# Patient Record
Sex: MALE | Race: WHITE | NOT HISPANIC OR LATINO | URBAN - METROPOLITAN AREA
[De-identification: names, ages, dates, MRNs, and addresses within clinical notes are randomized per-mention and may not be internally consistent; named-entity substitution may affect disease eponyms.]

---

## 2020-07-24 ENCOUNTER — EMERGENCY (EMERGENCY)
Facility: HOSPITAL | Age: 40
LOS: 1 days | Discharge: HOME | End: 2020-07-24
Attending: EMERGENCY MEDICINE
Payer: MEDICAID

## 2020-07-24 VITALS
HEART RATE: 89 BPM | OXYGEN SATURATION: 98 % | TEMPERATURE: 98 F | SYSTOLIC BLOOD PRESSURE: 121 MMHG | RESPIRATION RATE: 16 BRPM | DIASTOLIC BLOOD PRESSURE: 84 MMHG

## 2020-07-24 VITALS
HEART RATE: 98 BPM | WEIGHT: 257.94 LBS | SYSTOLIC BLOOD PRESSURE: 141 MMHG | TEMPERATURE: 98 F | HEIGHT: 72 IN | DIASTOLIC BLOOD PRESSURE: 90 MMHG | RESPIRATION RATE: 16 BRPM | OXYGEN SATURATION: 97 %

## 2020-07-24 PROCEDURE — 99283 EMERGENCY DEPT VISIT LOW MDM: CPT

## 2020-07-24 PROCEDURE — 99284 EMERGENCY DEPT VISIT MOD MDM: CPT

## 2020-07-24 RX ORDER — OXYCODONE HYDROCHLORIDE 5 MG/1
60 TABLET ORAL ONCE
Refills: 0 | Status: DISCONTINUED | OUTPATIENT
Start: 2020-07-24 | End: 2020-07-24

## 2020-07-24 RX ORDER — OXYCODONE HYDROCHLORIDE 5 MG/1
2 TABLET ORAL
Qty: 6 | Refills: 0
Start: 2020-07-24

## 2020-07-24 RX ADMIN — OXYCODONE HYDROCHLORIDE 60 MILLIGRAM(S): 5 TABLET ORAL at 02:58

## 2020-07-24 NOTE — ED PROVIDER NOTE - NSFOLLOWUPINSTRUCTIONS_ED_ALL_ED_FT
1. You were seen in the Emergency Room for back pain and medication refill   2. Please take all medications as prescribed. You may take ACETAMINOPHEN and IBUPROFEN as directed on packaging. Always follow medication instructions and read medication side effects. Stop using these medications if you have any concerns. You were prescribed Oxycodone for pain. You should take as prescribed. Do not drive or operate machinery while on this medication as it may make you sleepy  3. Please follow up with your doctor in 24-48 hours for medication refill  4. Return to the emergency room or seek immediate assistance for any new or concerning symptoms (such as fevers, chills, worsening back pain, difficulty walking, changes in strength or sensation ), or if you get worse.   5. Copies of your tests were provided to you for follow-up.  You must address all your findings with your doctor.

## 2020-07-24 NOTE — ED PROVIDER NOTE - PHYSICAL EXAMINATION
PHYSICAL EXAM:   General: well-appearing, appears stated age, in no acute distress  HEENT: NC/AT, L eye erythema (recently diagnosed with glaucoma, sees ophtho), airway patent  Cardiovascular: regular rate and rhythm, + S1/S2, no murmurs, rubs, gallops appreciated  Respiratory: clear to auscultation bilaterally, good aeration bilaterally, nonlabored respirations  Back: no costovertebral tenderness, no rashes noted, midline lower thoracic and lumbosacral spinal tenderness  Neuro: awake, alert, interactive. 5/5 strength of b/l LE, 2+ reflexes b/l LE. Sensation grossly intact to light touch of lower extremities. Ambulating independently. L sided straight leg test positive approx 45 degrees.   -Kate Ac PGY-2

## 2020-07-24 NOTE — ED PROVIDER NOTE - PROGRESS NOTE DETAILS
Kate Ac MD PGY-2 spoke with Dr. Montanez/Dr Gastelum office [phone] .  said that Dr. Moya said that this patient is not a patient of his anymore and this office did NOT send the patient to the ER for a refill. Paged placed to Dr. Moya requesting callback. Kate Ac MD PGY-2  pt requesting 9 days of oxycodone, tramadol, his sleeping pill to be refilled and his anxiety medication refilled. Told patient we can prescribed a few tablets of the oxycodone but will have to follow up with his pcp for the other medication refills. Patient eloped after he received the 60 mg oxycodone but prior to receiving his discharge instructions. Prior to receiving the oxy in the ER, pt stated that his girlfriend/wife could pick him up. If patient eloped, will not send oxycodone to pharmacy. Kate Ac MD PGY-2  pt requesting 9 days of oxycodone, tramadol, his sleeping pill to be refilled and his anxiety medication refilled. Told patient we can prescribed a few tablets of the oxycodone but will have to follow up with his pcp for the other medication refills. Kate Ac MD PGY-2  pt requesting 9 days of oxycodone, tramadol, his sleeping pill to be refilled and his anxiety medication refilled. Told patient we can prescribed a few tablets of the oxycodone but will have to follow up with his pcp for the other medication refills. Pt states wife can pick him up from ER

## 2020-07-24 NOTE — ED PROVIDER NOTE - ATTENDING CONTRIBUTION TO CARE
MD Olson:  patient seen and evaluated personally.   I agree with the History & Physical,  Impression & Plan other than what was detailed in my note.  MD Olson    39 y/o m w/ chronic pain presents to ed w/ cc of "I need a medication refill), takes 30 mg of oxy x2 qid, states he was told to come in and get two week prescription. no new traumas no change in back pain, no new numbness/tingling, no saddle anesthesia, afebrile vitals stable, non toxic, power 5/5  in b/l lower extrem, reflexes intact. sensation intact. no rf for emergent cause of back pain, noadditional images. attempted to call pmd who did not call back. did check istop, pt did not run out of prescription before time. plan to give 1-2 days of meds, first dose here. plan to dc.

## 2020-07-24 NOTE — ED PROVIDER NOTE - CLINICAL SUMMARY MEDICAL DECISION MAKING FREE TEXT BOX
41 yo M with PMH multiple herniated discs since working in [employer] years ago, GERD, glaucoma p/w worsening of his typical midline lower thoracic/lumbosacral back pain with radiation down posterior aspect of left thigh. No red flags for back pain. Will give oxy in ER (ensuring patient has ride home), and give a few pills to go home on to hold patient over until he can get refill from pcp

## 2020-07-24 NOTE — ED PROVIDER NOTE - OBJECTIVE STATEMENT
41 yo M with PMH multiple herniated discs since working in [employer] years ago p/w worsening of his typical midline lower thoracic/lumbosacral back pain with radiation down posterior aspect of left thigh. Typically takes 60 mg Oxycodone QID PRN. Took last oxycodone this AM (30 mg), gets chills and diarrhea when he runs out. Scheduled to see pain management July 29th. Called the office of Dr. graf and Dr. Moya reportedly who told him to come to the ER for a 2 week refill. No fevers, no urinary incontinence, no reported saddle anesthesia. Able to ambulate. 41 yo M with PMH multiple herniated discs since working in [employer] years ago, GERD, glaucoma p/w worsening of his typical midline lower thoracic/lumbosacral back pain with radiation down posterior aspect of left thigh. Typically takes 60 mg Oxycodone QID PRN. Took last oxycodone this AM (30 mg), gets chills and diarrhea when he runs out. Scheduled to see pain management July 29th. Called the office of Dr. graf and Dr. Moya reportedly who told him to come to the ER for a 2 week refill. No fevers, no urinary incontinence, no reported saddle anesthesia. Able to ambulate.

## 2020-07-24 NOTE — ED PROVIDER NOTE - NS ED ROS FT
REVIEW OF SYSTEMS:  General:  no fever, no chills  HEENT: no headache, no vision changes  Cardiac: no chest pain, no palpitations  Respiratory: no cough, no shortness of breath  Gastrointestinal: no abdominal pain, no nausea, no vomiting, no diarrhea, no melena, no hematochezia   Genitourinary: no hematuria, no dysuria, no urinary frequency, no urinary hesitancy, no urinary incontinence  Back: lower back pain midline radiating down left leg  Neuro: no reported saddle anesthesia  -Kate Ac PGY-2

## 2020-07-24 NOTE — ED PROVIDER NOTE - PATIENT PORTAL LINK FT
You can access the FollowMyHealth Patient Portal offered by [facility] by registering at the following website: [email or web address]. By joining [employer]’s FollowMyHealth portal, you will also be able to view your health information using other applications (apps) compatible with our system.

## 2020-12-24 PROBLEM — Z00.00 ENCOUNTER FOR PREVENTIVE HEALTH EXAMINATION: Status: ACTIVE | Noted: 2020-12-24

## 2021-06-17 PROBLEM — M54.9 DORSALGIA, UNSPECIFIED: Chronic | Status: ACTIVE | Noted: 2020-07-24

## 2021-06-17 PROBLEM — K21.9 GASTRO-ESOPHAGEAL REFLUX DISEASE WITHOUT ESOPHAGITIS: Chronic | Status: ACTIVE | Noted: 2020-07-24

## 2021-06-17 PROBLEM — H40.9 UNSPECIFIED GLAUCOMA: Chronic | Status: ACTIVE | Noted: 2020-07-24

## 2021-07-21 ENCOUNTER — APPOINTMENT (OUTPATIENT)
Dept: INTERNAL MEDICINE | Facility: CLINIC | Age: 41
End: 2021-07-21

## 2021-07-27 ENCOUNTER — APPOINTMENT (OUTPATIENT)
Dept: INTERNAL MEDICINE | Facility: CLINIC | Age: 41
End: 2021-07-27

## 2022-01-08 ENCOUNTER — EMERGENCY (EMERGENCY)
Facility: HOSPITAL | Age: 42
LOS: 1 days | Discharge: HOME | End: 2022-01-08
Attending: EMERGENCY MEDICINE
Payer: COMMERCIAL

## 2022-01-08 VITALS
RESPIRATION RATE: 18 BRPM | OXYGEN SATURATION: 100 % | SYSTOLIC BLOOD PRESSURE: 133 MMHG | TEMPERATURE: 98 F | HEIGHT: 72 IN | HEART RATE: 92 BPM | DIASTOLIC BLOOD PRESSURE: 89 MMHG | WEIGHT: 279.99 LBS

## 2022-01-08 VITALS
RESPIRATION RATE: 18 BRPM | OXYGEN SATURATION: 99 % | SYSTOLIC BLOOD PRESSURE: 132 MMHG | HEART RATE: 89 BPM | TEMPERATURE: 98 F | DIASTOLIC BLOOD PRESSURE: 90 MMHG

## 2022-01-08 PROCEDURE — 99284 EMERGENCY DEPT VISIT MOD MDM: CPT

## 2022-01-08 RX ORDER — TRAMADOL HYDROCHLORIDE 50 MG/1
1 TABLET ORAL
Qty: 12 | Refills: 0
Start: 2022-01-08 | End: 2022-01-10

## 2022-01-08 RX ORDER — TRAMADOL HYDROCHLORIDE 50 MG/1
50 TABLET ORAL ONCE
Refills: 0 | Status: DISCONTINUED | OUTPATIENT
Start: 2022-01-08 | End: 2022-01-08

## 2022-01-08 RX ADMIN — TRAMADOL HYDROCHLORIDE 50 MILLIGRAM(S): 50 TABLET ORAL at 21:44
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
## 2022-01-08 NOTE — ED PROVIDER NOTE - NSICDXPASTMEDICALHX_GEN_ALL_CORE_FT
PAST MEDICAL HISTORY:  Back pain hx of herniated discs    GERD (gastroesophageal reflux disease)     Glaucoma
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
## 2022-01-08 NOTE — ED PROVIDER NOTE - NSFOLLOWUPINSTRUCTIONS_ED_ALL_ED_FT
- stay hydrated.   - take tylenol 975mg and ibuprofen 600mg every 6 hours as needed for pain-take with meals.  -take tramadol as prescribed  -follow up with pain management, call number attached to make an appointment  - return if symptoms worsen, fever, weakness, numbness/tingling, blurred vision, difficulty ambulating and all other concerns.
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
## 2022-01-08 NOTE — ED PROVIDER NOTE - PHYSICAL EXAMINATION
A&Ox3, NAD, well appearing  NCAT. PERRL, EOMI.  Neck supple, no LAD.   Lungs CTAB. No w/r/r  Cardiac +S1S2, RRR, No m/r/g.   Abd soft, NT/ND, +BS, no rebound or guarding.   Extremities: cap refill <2, pulses in distal extremities 4+, no edema.   Skin without rash.   No focal Defecits, Strength 5/5 UE/LE. Gait steady. A&Ox3, NAD, well appearing  NCAT. PERRL, EOMI.  Neck supple, no LAD.   Lungs CTAB. No w/r/r  Cardiac +S1S2, RRR, No m/r/g.   Abd soft, NT/ND, +BS, no rebound or guarding.   Extremities: cap refill <2, pulses in distal extremities 4+, no edema. + left shoulder mild ttp. FROM of b/l ue, le.   Skin without rash.   No focal Defecits, Strength 5/5 UE/LE. Gait steady.

## 2022-01-08 NOTE — ED ADULT NURSE NOTE - DISCHARGE DATE/TIME
normal appearance , without tenderness upon palpation , no deformities , trachea midline , Thyroid normal size , no thyroid nodules , no masses , no JVD , thyroid nontender 08-Jan-2022 22:23
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
## 2022-01-08 NOTE — ED PROVIDER NOTE - PATIENT PORTAL LINK FT
You can access the FollowMyHealth Patient Portal offered by [facility] by registering at the following website: [email or web address]. By joining [employer]’s FollowMyHealth portal, you will also be able to view your health information using other applications (apps) compatible with our system.

## 2022-01-08 NOTE — ED PROVIDER NOTE - PROGRESS NOTE DETAILS
pt insisting we speak with Dr. Moya, spoke with Dr. moya who state she has not taken care of the pt in many years and is not a patient of his. -Anahi Calloway PA-C ISTOP: [phone]  shows last 1/3/2022 disepend 100  oxycodone 20 mg x 25 days. Hellen: spoke with patient and states has not been able to sleep. took 20 mg oxycodone 7 pm prior to arrival with 600 mg ibuprofen. states patches don't work for him. states doesn't want to increase his oxycodone at home.  requesting fentanyl in injection.  advised cannot give fentanyl in injection. patient ambulating with no difficulty, went to bathroom.  has taken tramadol in the past. will give a dose and d/c home. has f/u with pain management. Hellen: Patient asleep in bed. will d/c home ISTOP: [phone]  shows last 1/3/2022 dispensed 100  oxycodone 20 mg x 25 days.

## 2022-06-08 ENCOUNTER — APPOINTMENT (OUTPATIENT)
Dept: INTERNAL MEDICINE | Facility: CLINIC | Age: 42
End: 2022-06-08

## 2022-07-07 ENCOUNTER — EMERGENCY (EMERGENCY)
Facility: HOSPITAL | Age: 42
LOS: 1 days | Discharge: HOME | End: 2022-07-07
Attending: EMERGENCY MEDICINE
Payer: MEDICAID

## 2022-07-07 VITALS
DIASTOLIC BLOOD PRESSURE: 84 MMHG | SYSTOLIC BLOOD PRESSURE: 141 MMHG | OXYGEN SATURATION: 95 % | HEART RATE: 114 BPM | RESPIRATION RATE: 19 BRPM

## 2022-07-07 LAB
ALBUMIN SERPL ELPH-MCNC: 4.4 G/DL — SIGNIFICANT CHANGE UP (ref 3.3–5)
ALP SERPL-CCNC: 65 U/L — SIGNIFICANT CHANGE UP (ref 40–120)
ALT FLD-CCNC: 46 U/L — HIGH (ref 10–45)
ANION GAP SERPL CALC-SCNC: 16 MMOL/L — SIGNIFICANT CHANGE UP (ref 5–17)
APAP SERPL-MCNC: <15 UG/ML — SIGNIFICANT CHANGE UP (ref 10–30)
AST SERPL-CCNC: 25 U/L — SIGNIFICANT CHANGE UP (ref 10–40)
BASOPHILS # BLD AUTO: 0.05 K/UL — SIGNIFICANT CHANGE UP (ref 0–0.2)
BASOPHILS NFR BLD AUTO: 0.4 % — SIGNIFICANT CHANGE UP (ref 0–2)
BILIRUB SERPL-MCNC: 0.8 MG/DL — SIGNIFICANT CHANGE UP (ref 0.2–1.2)
BUN SERPL-MCNC: 14 MG/DL — SIGNIFICANT CHANGE UP (ref 7–23)
CALCIUM SERPL-MCNC: 9.2 MG/DL — SIGNIFICANT CHANGE UP (ref 8.4–10.5)
CHLORIDE SERPL-SCNC: 103 MMOL/L — SIGNIFICANT CHANGE UP (ref 96–108)
CO2 SERPL-SCNC: 22 MMOL/L — SIGNIFICANT CHANGE UP (ref 22–31)
CREAT SERPL-MCNC: 0.78 MG/DL — SIGNIFICANT CHANGE UP (ref 0.5–1.3)
EGFR: 114 ML/MIN/1.73M2 — SIGNIFICANT CHANGE UP
EOSINOPHIL # BLD AUTO: 0.29 K/UL — SIGNIFICANT CHANGE UP (ref 0–0.5)
EOSINOPHIL NFR BLD AUTO: 2.3 % — SIGNIFICANT CHANGE UP (ref 0–6)
ETHANOL SERPL-MCNC: <10 MG/DL — SIGNIFICANT CHANGE UP (ref 0–10)
GLUCOSE SERPL-MCNC: 136 MG/DL — HIGH (ref 70–99)
HCT VFR BLD CALC: 45.8 % — SIGNIFICANT CHANGE UP (ref 39–50)
HGB BLD-MCNC: 15.8 G/DL — SIGNIFICANT CHANGE UP (ref 13–17)
IMM GRANULOCYTES NFR BLD AUTO: 0.8 % — SIGNIFICANT CHANGE UP (ref 0–1.5)
LYMPHOCYTES # BLD AUTO: 27 % — SIGNIFICANT CHANGE UP (ref 13–44)
LYMPHOCYTES # BLD AUTO: 3.37 K/UL — HIGH (ref 1–3.3)
MCHC RBC-ENTMCNC: 29.1 PG — SIGNIFICANT CHANGE UP (ref 27–34)
MCHC RBC-ENTMCNC: 34.5 GM/DL — SIGNIFICANT CHANGE UP (ref 32–36)
MCV RBC AUTO: 84.3 FL — SIGNIFICANT CHANGE UP (ref 80–100)
MONOCYTES # BLD AUTO: 1.35 K/UL — HIGH (ref 0–0.9)
MONOCYTES NFR BLD AUTO: 10.8 % — SIGNIFICANT CHANGE UP (ref 2–14)
NEUTROPHILS # BLD AUTO: 7.31 K/UL — SIGNIFICANT CHANGE UP (ref 1.8–7.4)
NEUTROPHILS NFR BLD AUTO: 58.7 % — SIGNIFICANT CHANGE UP (ref 43–77)
NRBC # BLD: 0 /100 WBCS — SIGNIFICANT CHANGE UP (ref 0–0)
PLATELET # BLD AUTO: 371 K/UL — SIGNIFICANT CHANGE UP (ref 150–400)
POTASSIUM SERPL-MCNC: 3.5 MMOL/L — SIGNIFICANT CHANGE UP (ref 3.5–5.3)
POTASSIUM SERPL-SCNC: 3.5 MMOL/L — SIGNIFICANT CHANGE UP (ref 3.5–5.3)
PROT SERPL-MCNC: 6.7 G/DL — SIGNIFICANT CHANGE UP (ref 6–8.3)
RBC # BLD: 5.43 M/UL — SIGNIFICANT CHANGE UP (ref 4.2–5.8)
RBC # FLD: 12.6 % — SIGNIFICANT CHANGE UP (ref 10.3–14.5)
SALICYLATES SERPL-MCNC: <2 MG/DL — LOW (ref 15–30)
SODIUM SERPL-SCNC: 141 MMOL/L — SIGNIFICANT CHANGE UP (ref 135–145)
WBC # BLD: 12.47 K/UL — HIGH (ref 3.8–10.5)
WBC # FLD AUTO: 12.47 K/UL — HIGH (ref 3.8–10.5)

## 2022-07-07 PROCEDURE — 12001 RPR S/N/AX/GEN/TRNK 2.5CM/<: CPT

## 2022-07-07 PROCEDURE — 73130 X-RAY EXAM OF HAND: CPT | Mod: 26,RT

## 2022-07-07 PROCEDURE — 70450 CT HEAD/BRAIN W/O DYE: CPT | Mod: 26,MA

## 2022-07-07 PROCEDURE — 73030 X-RAY EXAM OF SHOULDER: CPT | Mod: 26,RT

## 2022-07-07 PROCEDURE — 93010 ELECTROCARDIOGRAM REPORT: CPT | Mod: 59

## 2022-07-07 PROCEDURE — 99285 EMERGENCY DEPT VISIT HI MDM: CPT | Mod: 25

## 2022-07-07 PROCEDURE — 72125 CT NECK SPINE W/O DYE: CPT | Mod: 26,MA

## 2022-07-07 RX ORDER — TETANUS TOXOID, REDUCED DIPHTHERIA TOXOID AND ACELLULAR PERTUSSIS VACCINE, ADSORBED 5; 2.5; 8; 8; 2.5 [IU]/.5ML; [IU]/.5ML; UG/.5ML; UG/.5ML; UG/.5ML
0.5 SUSPENSION INTRAMUSCULAR ONCE
Refills: 0 | Status: COMPLETED | OUTPATIENT
Start: 2022-07-07 | End: 2022-07-07

## 2022-07-07 RX ORDER — OXYCODONE HYDROCHLORIDE 5 MG/1
30 TABLET ORAL ONCE
Refills: 0 | Status: DISCONTINUED | OUTPATIENT
Start: 2022-07-07 | End: 2022-07-07

## 2022-07-07 RX ORDER — ACETAMINOPHEN 500 MG
1000 TABLET ORAL ONCE
Refills: 0 | Status: DISCONTINUED | OUTPATIENT
Start: 2022-07-07 | End: 2022-07-07

## 2022-07-07 RX ORDER — SODIUM CHLORIDE 9 MG/ML
1000 INJECTION INTRAMUSCULAR; INTRAVENOUS; SUBCUTANEOUS ONCE
Refills: 0 | Status: COMPLETED | OUTPATIENT
Start: 2022-07-07 | End: 2022-07-07

## 2022-07-07 RX ADMIN — SODIUM CHLORIDE 1000 MILLILITER(S): 9 INJECTION INTRAMUSCULAR; INTRAVENOUS; SUBCUTANEOUS at 20:31

## 2022-07-07 RX ADMIN — OXYCODONE HYDROCHLORIDE 30 MILLIGRAM(S): 5 TABLET ORAL at 23:41

## 2022-07-07 RX ADMIN — TETANUS TOXOID, REDUCED DIPHTHERIA TOXOID AND ACELLULAR PERTUSSIS VACCINE, ADSORBED 0.5 MILLILITER(S): 5; 2.5; 8; 8; 2.5 SUSPENSION INTRAMUSCULAR at 20:33
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
## 2022-07-07 NOTE — ED PROVIDER NOTE - PHYSICAL EXAMINATION
Gen - NAD; grossly intoxicated; A+Ox3   HEENT - ~1cm hemostatic scalp laceration to L parietal region, EOMI  Neck - supple  Resp - CTAB, no increased WOB  CV -  RRR, no m/r/g  Abd - soft, NT, ND; no guarding or rebound  Back - no midline tenderness/stepoffs  MSK - FROM of b/l UE and LE, no gross deformities  Extrem - no LE edema/erythema/tenderness  Neuro - no focal motor or sensation deficits  Skin - warm, well perfused

## 2022-07-07 NOTE — ED PROVIDER NOTE - PATIENT PORTAL LINK FT
You can access the FollowMyHealth Patient Portal offered by [facility] by registering at the following website: [email or web address]. By joining [employer]’s FollowMyHealth portal, you will also be able to view your health information using other applications (apps) compatible with our system.
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
## 2022-07-07 NOTE — ED PROVIDER NOTE - NSFOLLOWUPINSTRUCTIONS_ED_ALL_ED_FT
You were seen in the Emergency Department for: scalp laceration    For pain/fever, you may take Tylenol (acetaminophen) 650 mg every 6 hours, AND/OR Advil (ibuprofen) 600 mg every 8 hours.    YOU WILL NEED TO HAVE YOUR STAPLES REMOVED FROM YOUR SCALP IN 7-10 DAYS.     PLEASE SPEAK WITH YOUR PRIMARY DOCTOR OR PRESENT TO AN URGENT CARE/EMERGENCY DEPARTMENT TO HAVE THEM REMOVED.    KEEP THE AREA COVERED WITH ANTIBIOTIC OINTMENT, APPLY TWICE DAILY    Please follow up with your primary physician as discussed. If you do not have a primary physician or specialist of your needs, please call [phone] to find one convenient for you. At this number you will be able to locate a provider who accepts your insurance, as well as locate the right specialist for your needs.    You should return to the Emergency Department if you feel any new/worsening/persistent symptoms including but not limited to: chest pain, difficulty breathing, loss of consciousness, bleeding, uncontrolled pain, numbness/weakness of a body part Patient is fit for confinement.    You were seen in the Emergency Department for: scalp laceration    For pain/fever, you may take Tylenol (acetaminophen) 650 mg every 6 hours, AND/OR Advil (ibuprofen) 600 mg every 8 hours.    YOU WILL NEED TO HAVE YOUR STAPLES REMOVED FROM YOUR SCALP IN 7-10 DAYS.     PLEASE SPEAK WITH YOUR PRIMARY DOCTOR OR PRESENT TO AN URGENT CARE/EMERGENCY DEPARTMENT TO HAVE THEM REMOVED.    KEEP THE AREA COVERED WITH ANTIBIOTIC OINTMENT, APPLY TWICE DAILY    Please follow up with your primary physician as discussed. If you do not have a primary physician or specialist of your needs, please call [phone] to find one convenient for you. At this number you will be able to locate a provider who accepts your insurance, as well as locate the right specialist for your needs.    You should return to the Emergency Department if you feel any new/worsening/persistent symptoms including but not limited to: chest pain, difficulty breathing, loss of consciousness, bleeding, uncontrolled pain, numbness/weakness of a body part
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
## 2022-07-07 NOTE — ED ADULT NURSE REASSESSMENT NOTE - NS ED NURSE REASSESS COMMENT FT1
Pt is becoming agitated because he is requesting home medications. MD Alcantara made aware, and at bedside.

## 2022-07-08 VITALS
RESPIRATION RATE: 18 BRPM | DIASTOLIC BLOOD PRESSURE: 90 MMHG | OXYGEN SATURATION: 96 % | TEMPERATURE: 97 F | HEART RATE: 93 BPM | SYSTOLIC BLOOD PRESSURE: 115 MMHG

## 2022-07-08 PROCEDURE — 73030 X-RAY EXAM OF SHOULDER: CPT

## 2022-07-08 PROCEDURE — 73130 X-RAY EXAM OF HAND: CPT

## 2022-07-08 PROCEDURE — 90471 IMMUNIZATION ADMIN: CPT

## 2022-07-08 PROCEDURE — 85025 COMPLETE CBC W/AUTO DIFF WBC: CPT

## 2022-07-08 PROCEDURE — 36415 COLL VENOUS BLD VENIPUNCTURE: CPT

## 2022-07-08 PROCEDURE — 80053 COMPREHEN METABOLIC PANEL: CPT

## 2022-07-08 PROCEDURE — 93005 ELECTROCARDIOGRAM TRACING: CPT | Mod: XU

## 2022-07-08 PROCEDURE — 70450 CT HEAD/BRAIN W/O DYE: CPT | Mod: MA

## 2022-07-08 PROCEDURE — 80307 DRUG TEST PRSMV CHEM ANLYZR: CPT

## 2022-07-08 PROCEDURE — 90715 TDAP VACCINE 7 YRS/> IM: CPT

## 2022-07-08 PROCEDURE — 12001 RPR S/N/AX/GEN/TRNK 2.5CM/<: CPT

## 2022-07-08 PROCEDURE — 72125 CT NECK SPINE W/O DYE: CPT | Mod: MA

## 2022-07-08 PROCEDURE — 99285 EMERGENCY DEPT VISIT HI MDM: CPT | Mod: 25

## 2022-07-08 RX ORDER — ALPRAZOLAM 0.25 MG
1 TABLET ORAL ONCE
Refills: 0 | Status: DISCONTINUED | OUTPATIENT
Start: 2022-07-08 | End: 2022-07-08

## 2022-07-08 RX ADMIN — Medication 1 MILLIGRAM(S): at 01:37

## 2022-07-08 RX ADMIN — OXYCODONE HYDROCHLORIDE 30 MILLIGRAM(S): 5 TABLET ORAL at 00:11

## 2022-07-08 NOTE — ED ADULT NURSE REASSESSMENT NOTE - NS ED NURSE REASSESS COMMENT FT1
As per MD Alcantara, pt okay to DC after ambulating. Pt ambulated with steady gate, denies complaints or difficulty. MD made aware.

## 2022-12-25 ENCOUNTER — EMERGENCY (EMERGENCY)
Facility: HOSPITAL | Age: 42
LOS: 1 days | Discharge: HOME | End: 2022-12-25
Attending: EMERGENCY MEDICINE | Admitting: EMERGENCY MEDICINE
Payer: MEDICAID

## 2022-12-25 VITALS
OXYGEN SATURATION: 95 % | HEIGHT: 72 IN | RESPIRATION RATE: 16 BRPM | DIASTOLIC BLOOD PRESSURE: 87 MMHG | SYSTOLIC BLOOD PRESSURE: 136 MMHG | WEIGHT: 285.06 LBS | HEART RATE: 96 BPM | TEMPERATURE: 98 F

## 2022-12-25 PROCEDURE — 99284 EMERGENCY DEPT VISIT MOD MDM: CPT

## 2022-12-25 PROCEDURE — 99283 EMERGENCY DEPT VISIT LOW MDM: CPT

## 2022-12-25 RX ORDER — OXYCODONE HYDROCHLORIDE 5 MG/1
30 TABLET ORAL ONCE
Refills: 0 | Status: DISCONTINUED | OUTPATIENT
Start: 2022-12-25 | End: 2022-12-25

## 2022-12-25 RX ORDER — OXYCODONE AND ACETAMINOPHEN 5; 325 MG/1; MG/1
3 TABLET ORAL
Qty: 6 | Refills: 0
Start: 2022-12-25

## 2022-12-25 RX ORDER — OXYCODONE HYDROCHLORIDE 5 MG/1
1 TABLET ORAL
Qty: 2 | Refills: 0
Start: 2022-12-25

## 2022-12-25 RX ADMIN — OXYCODONE HYDROCHLORIDE 30 MILLIGRAM(S): 5 TABLET ORAL at 19:45

## 2022-12-25 NOTE — ED ADULT NURSE NOTE - FINAL NURSING ELECTRONIC SIGNATURE
Hospital Course:    79 yo Marshallese speaking M PMHx DM2, HTN, R endarterectomy (2017) c/b ??hemorrhage, GI bleed s/p colonic tumor removal (2017), paroxysmal Afib/flutter presenting from OSH as CCU transfer for ADHF with aflutter with variable block/junctional escape.     History obtained through chart review.     Patient was very independent and in a good state of health until July when he had a R carotid endarterectomy which was complicated by bleeding resulting in a month long hospitalization at [place]. Since the event he has had about 2-3 hospitalizations from other complications followed by rehab stays. One of his episodes of GI bleed resulted in aspiration pneumonia, PAF, and kidney failure with prolonged hospitalization. Original echo and pharmacologic stress tests were negative. In 2017 he had subsequent GIB with colon polyp (? polypectomy associated bleeding). In January he was evaluated by Dr. Cabrera where he was noted to have palpitations, had holter with HR in 80s, first degree AVB and PAF. He was maintained on only plavix due to concern of GIB.     Most recently, the patient has been doing better and living at home with his wife. Wife reports he is able to walk around the house without difficulty and mainly only needs help changing. During the morning prior to admission he woke up feeling very short of breath so they called EMS and he was brought to [facility].     At presentation at OSH he had severe shortness of breath, with BP of 200/80, HR of 40-50. He was placed on BIPAP with PaO2 80 on CPAP 100%. He was given Duoneb, lasix 60mg IV, solumedrol 125mg and had notable diuresis per wife after placement of fernández.     During transit/on admission he was given magnesium for possible torsades. He was noted to have atrial flutter with complete heart block and intermittent narrow/wide complex escape rhythm. He also had 10-12 s of polymorphic VT, thought to be from prolonged QTc in setting of his bradycardia. He was transferred to the CCU for his ADHF and possible PPM.     While at the CCU: He was noted to be hypertensive to 200s and was started on nitroglycerin drip. He sustained bradycardia in 30-40s with atrial flutter with junctional escape rhythm. He was evaluated by EP and TVP was held off. He was further diuresed and had notable elevations in cardiac enzymes likely from demand and elevated creatinine. He had TTE (3/14) with overall preserved LV function (however suboptimal study), and cath (3/15) with 85% stenosis of proximal circumflex and 100% stenosis of proximal RCA (chronic total occlusion) recommending correlation if VT was rate associated, otherwise consider PCI.         - A flutter -- ideally heparin/oral AC but recent large GI bleed, will need GI referral  - Cath  - Required intubation during ICD for agitation  - Dual chamber ICD 3/15  - Extubated on 3/16  - Gastro - as patient is not actively bleeding preferable to proceed with anticoagulation which can be stopped and at that time perform endoscopy  -- Likely GI bleeding from aspirin associated PUD and post-polypectomy GI bleed, chance of recurrent ulcer small, requested details of priro EGD/colonoscopy/hospitalizations, H pylori stool antigen and serum, iron studies, empiric PPI (pantoprazole 40 mg daily while on antiplatelet agents)      [facility] CCU course: D/C nitro gtt. Episode of torsades with normal magnesium and K. Multiple episodes of nonsustained VT, self-resolved. Cath with 100$ chronic lesion in the RCA, no intervention at that time. Due to recurrent polymorphic VT, patient had dual chamber ICD placement on 3/15/18, set to 80 BPM with no further episodes. Patient was electively intubated for the procedure, extubated 3/16/18. GI consulted for evaluation for bleeding risk in anticipation of possible watchman.   ROS:      Imaging:  TTE 3/14  1. Mitral annular calcification.  Thickened mitral valve leaflets. Mild mitral regurgitation.  2. Aortic valve not well visualized; appears calcified with normal opening. Peak transaortic valve gradient equals 10  mm Hg. Minimal aortic regurgitation.   3. Left atrium not well visualized; appears dilated.  4. Normal left ventricular internal dimensions.  5. Endocardial visualization enhanced with intravenous injection of echo contrast (Definity).  Endocardium not  well visualized despite the use of echo contrast; overall left ventricular systolic function appears preserved.  Paradoxical septal motion.  6. The right ventricle is not well visualized.  7. Estimated right ventricular systolic pressure equals 48 mm Hg, assuming right atrial pressure equals 8 mm Hg,  consistent with mild pulmonary hypertension.    Cath 3/15:  LM:   --  LM: Normal.  LAD:   --  LAD: Angiography showed minor luminal irregularities with no  flow limiting lesions.  CX:   --  Proximal circumflex: There was a tubular 85 % stenosis. There was  JUSTYN grade 3 flow through the vessel (brisk flow).  RCA:   --  Proximal RCA: There was a 100 % stenosis. This lesion is a  chronic total occlusion.  COMPLICATIONS: There were no complications.  DIAGNOSTIC RECOMMENDATIONS: Recommend EP correlation to determine if the VT  is rate related, then can consider PCI to the LCx.  INTERVENTIONAL RECOMMENDATIONS: Recommend EP correlation to determine if  the VT is rate related, then can consider PCI to the LCx.    All: NKDA  Meds:    Physical exam:  VS:      Laboratories:    Imagin/2017- stress test neg for ischemia, at that time in NSR w/ RBBB  3/2017- echo showed normal LV function w/ moderate concentric hypertrophy Hospital Course:    81 yo Anguillan speaking M PMHx DMT2, HTN, R endarterectomy (2017) c/b bleed, partial SBO, admission 10/2017 with septic shock and respiratory failure c/ chest tube and pleurodesis, admitted 2018 for anemia s/p emergent EGD (hg of 5.4) with evidence duodenal ulcers/duodenitis/sessile duodenal polyp w/ elevated troponins, renal failure, CXR concerning for persistent RLL process and effusion, paroxysmal Afib/flutter presenting from OSH as CCU transfer for ADHF in setting of ?hypertensive emergency c/b aflutter with variable block/junctional escape and polymorphic VT.     History obtained through chart review.     Patient was very independent and in a good state of health until July when he had a R carotid endarterectomy which was complicated by bleeding resulting in a month long hospitalization at [place]. Since the event he has had about 2-3 hospitalizations from other complications followed by rehab stays. One of his episodes of GI bleed resulted in aspiration pneumonia, PAF, and kidney failure with prolonged hospitalization. Original echocardiogram and pharmacologic stress tests were negative. In the end of 2017 he had anemia from GI bleeding requiring urgent EGD showing gastritis and duodenal ulcers without active bleeding. There was no evidence of H pylori on biopsy. He also had a colonscopy with 8 mm descending colon sessile polyp. In January he was evaluated by Dr. Cabrera where he was noted to have palpitations. He had a holter with HR in 80s, first degree AVB and PAF. He was maintained on only plavix due to concern of GIB.     Most recently, the patient has been doing better and living at home with his wife. Wife reports he is able to walk around the house without difficulty and mainly only needs help changing. During the morning prior to admission he woke up feeling very short of breath so they called EMS and he was brought to [facility].     At presentation at OSH he had severe shortness of breath, with BP of 200/80, HR of 40-50. He was placed on BIPAP with PaO2 80 on CPAP 100%. He was given Duoneb, lasix 60mg IV, solumedrol 125mg and had notable diuresis per wife after placement of fernández.     During transit/on admission:  He was given magnesium for possible torsades. He was noted to have atrial flutter with complete heart block and intermittent narrow/wide complex escape rhythm. He also had 10-12 s of polymorphic VT, thought to be from prolonged QTc in setting of his bradycardia. He was transferred to the CCU for his ADHF and possible PPM.     While at the CCU:   He was noted to be hypertensive to 200s and was started on nitroglycerin drip. He sustained bradycardia in 30-40s with atrial flutter with junctional escape rhythm. He was evaluated by EP and TVP was held off. He was further diuresed and had notable elevations in cardiac enzymes likely from demand and elevated creatinine. He had multiple episodes of nonsustained VT, self-resolved. He had TTE (3/14) with overall preserved LV function (however suboptimal study), and cath (3/15) with 85% stenosis of proximal circumflex and 100% stenosis of proximal RCA (chronic total occlusion) recommending correlation if VT was rate associated, otherwise consider PCI. On 3/15 he had a dual chamber ICD placed. He was intubated due to agitation and was extubated the following day.     GI was consulted in anticipation of possible watchman. GI requested outside records (which are now in the chart) and to continue PPI and test for H pylori.     This morning:  History was obtained through wife at bedside as patient was Anguillan speaking. Patient was endorsing severe headache for the last few hours. He appeared to be tachypneic and hypoxic (poor wave form) which improved to 90s after supplemental oxygen was replaced. His blood pressure at bedside was 116 systolic. He had a bowel movement two days previously and continued to have fernández in place. He denied shortness of breath and chest pain. 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  He had no difficulty swallowing.     Imaging:  TTE 3/14  1. Mitral annular calcification.  Thickened mitral valve leaflets. Mild mitral regurgitation.  2. Aortic valve not well visualized; appears calcified with normal opening. Peak transaortic valve gradient equals 10  mm Hg. Minimal aortic regurgitation.   3. Left atrium not well visualized; appears dilated.  4. Normal left ventricular internal dimensions.  5. Endocardial visualization enhanced with intravenous injection of echo contrast (Definity).  Endocardium not  well visualized despite the use of echo contrast; overall left ventricular systolic function appears preserved.  Paradoxical septal motion.  6. The right ventricle is not well visualized.  7. Estimated right ventricular systolic pressure equals 48 mm Hg, assuming right atrial pressure equals 8 mm Hg,  consistent with mild pulmonary hypertension.    Cath 3/15:  LM:   --  LM: Normal.  LAD:   --  LAD: Angiography showed minor luminal irregularities with no  flow limiting lesions.  CX:   --  Proximal circumflex: There was a tubular 85 % stenosis. There was  JUSTYN grade 3 flow through the vessel (brisk flow).  RCA:   --  Proximal RCA: There was a 100 % stenosis. This lesion is a  chronic total occlusion.  COMPLICATIONS: There were no complications.  DIAGNOSTIC RECOMMENDATIONS: Recommend EP correlation to determine if the VT  is rate related, then can consider PCI to the LCx.  INTERVENTIONAL RECOMMENDATIONS: Recommend EP correlation to determine if  the VT is rate related, then can consider PCI to the LCx.    All: NKDA    Meds:  Tylenol for pain  Atorvastatin 40  Carvedilol 12.5 BID  Cosopt BID  Duoneb PRN Q6  Artificial tears  Brimonidine 0.2% BID  SSI  Lasix 40 IVP BID  Loteprednol 0.5% BID  Tamsulosin 0.4 qhs    Physical exam:  VS:      Laboratories:    Imagin/2017- stress test neg for ischemia, at that time in NSR w/ RBBB  3/2017- echo showed normal LV function w/ moderate concentric hypertrophy      Assessment and Plan:    - Will need to touch base with GI in AM to review endoscopy and colonoscopy reports due to concern that patient initially presented with low hemoglobin 5.4 requiring urgent EGD at previous hospitalization which was notable for hiatal hernia, duodenal ulcer and duodenitis. Colonoscopy was also performed at that time showing 8 mm sessile polyp.     - Will need to touch base with cardiology in AM to clarify plan for Watchman and if PCI is indicated given 85% stenosis of proximal circumflex Hospital Course:    79 yo Greenlandic speaking M PMHx DMT2, HTN, R endarterectomy (7/2017) c/b bleed, partial SBO, admission 10/2017 with septic shock and respiratory failure c/ chest tube and pleurodesis, admitted Jan 2018 for anemia s/p emergent EGD (hg of 5.4) with evidence duodenal ulcers/duodenitis/sessile duodenal polyp w/ elevated troponins, renal failure, CXR concerning for persistent RLL process and effusion, paroxysmal Afib/flutter presenting from OSH as CCU transfer for ADHF in setting of ?hypertensive emergency c/b aflutter with variable block/junctional escape and polymorphic VT.     History obtained through chart review.     Patient was very independent and in a good state of health until July when he had a R carotid endarterectomy which was complicated by bleeding resulting in a month long hospitalization at [place]. Since the event he has had about 2-3 hospitalizations from other complications followed by rehab stays. One of his episodes of GI bleed resulted in aspiration pneumonia, PAF, and kidney failure with prolonged hospitalization. Original echocardiogram and pharmacologic stress tests were negative. In the end of 12/2017 he had anemia from GI bleeding requiring urgent EGD showing gastritis and duodenal ulcers without active bleeding. There was no evidence of H pylori on biopsy. He also had a colonscopy with 8 mm descending colon sessile polyp. In January he was evaluated by Dr. Cabrera where he was noted to have palpitations. He had a holter with HR in 80s, first degree AVB and PAF. He was maintained on only plavix due to concern of GIB.     Most recently, the patient has been doing better and living at home with his wife. Wife reports he is able to walk around the house without difficulty and mainly only needs help changing. During the morning prior to admission he woke up feeling very short of breath so they called EMS and he was brought to [facility].     At presentation at OSH he had severe shortness of breath, with BP of 200/80, HR of 40-50. He was placed on BIPAP with PaO2 80 on CPAP 100%. He was given Duoneb, lasix 60mg IV, solumedrol 125mg and had notable diuresis per wife after placement of fernández.     During transit/on admission:  He was given magnesium for possible torsades. He was noted to have atrial flutter with complete heart block and intermittent narrow/wide complex escape rhythm. He also had 10-12 s of polymorphic VT, thought to be from prolonged QTc in setting of his bradycardia. He was transferred to the CCU for his ADHF and possible PPM.     While at the CCU:   He was noted to be hypertensive to 200s and was started on nitroglycerin drip. He sustained bradycardia in 30-40s with atrial flutter with junctional escape rhythm. He was evaluated by EP and TVP was held off. He was further diuresed and had notable elevations in cardiac enzymes likely from demand and elevated creatinine. He had multiple episodes of nonsustained VT, self-resolved. He had TTE (3/14) with overall preserved LV function (however suboptimal study), and cath (3/15) with 85% stenosis of proximal circumflex and 100% stenosis of proximal RCA (chronic total occlusion) recommending correlation if VT was rate associated, otherwise consider PCI. On 3/15 he had a dual chamber ICD placed. He was intubated due to agitation and was extubated the following day.     GI was consulted in anticipation of possible watchman. GI requested outside records (which are now in the chart) and to continue PPI and test for H pylori.     This morning:  History was obtained through wife at bedside as patient was Greenlandic speaking. Patient was endorsing severe headache for the last few hours. He appeared to be tachypneic and hypoxic (poor wave form) which improved to 90s after supplemental oxygen was replaced. His blood pressure at bedside was 116 systolic. He had a bowel movement two days previously and continued to have fernández in place. He denied shortness of breath and chest pain. 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  He had no difficulty swallowing.     Imaging:  TTE 3/14  1. Mitral annular calcification.  Thickened mitral valve leaflets. Mild mitral regurgitation.  2. Aortic valve not well visualized; appears calcified with normal opening. Peak transaortic valve gradient equals 10  mm Hg. Minimal aortic regurgitation.   3. Left atrium not well visualized; appears dilated.  4. Normal left ventricular internal dimensions.  5. Endocardial visualization enhanced with intravenous injection of echo contrast (Definity).  Endocardium not  well visualized despite the use of echo contrast; overall left ventricular systolic function appears preserved.  Paradoxical septal motion.  6. The right ventricle is not well visualized.  7. Estimated right ventricular systolic pressure equals 48 mm Hg, assuming right atrial pressure equals 8 mm Hg,  consistent with mild pulmonary hypertension.    Cath 3/15:  LM:   --  LM: Normal.  LAD:   --  LAD: Angiography showed minor luminal irregularities with no  flow limiting lesions.  CX:   --  Proximal circumflex: There was a tubular 85 % stenosis. There was  JUSTYN grade 3 flow through the vessel (brisk flow).  RCA:   --  Proximal RCA: There was a 100 % stenosis. This lesion is a  chronic total occlusion.  COMPLICATIONS: There were no complications.  DIAGNOSTIC RECOMMENDATIONS: Recommend EP correlation to determine if the VT  is rate related, then can consider PCI to the LCx.  INTERVENTIONAL RECOMMENDATIONS: Recommend EP correlation to determine if  the VT is rate related, then can consider PCI to the LCx.    4/2017- stress test neg for ischemia, at that time in NSR w/ RBBB  3/2017- echo showed normal LV function w/ moderate concentric hypertrophy      All: NKDA    Meds:  Plavix  Bacitracin  Tylenol for pain  Atorvastatin 40  Carvedilol 12.5 BID  Cosopt BID  Duoneb PRN Q6  Artificial tears  Brimonidine 0.2% BID  SSI  Lasix 40 IVP BID  Loteprednol 0.5% BID  Tamsulosin 0.4 qhs    Physical exam:  Vital Signs Last 24 Hrs  T(C): 36.7 (17 Mar 2018 04:24), Max: 37.2 (16 Mar 2018 19:00)  T(F): 98 (17 Mar 2018 04:24), Max: 98.9 (16 Mar 2018 19:00)  HR: 66 (17 Mar 2018 04:24) (66 - 102)  BP: 148/74 (17 Mar 2018 04:24) (123/60 - 193/81)  BP(mean): 112 (16 Mar 2018 21:00) (79 - 128)  RR: 18 (17 Mar 2018 04:24) (11 - 28)  SpO2: 98% (17 Mar 2018 04:24) (93% - 98%)    General: Awake, alert, Greenlandic speaking  HEENT: NCAT  Skin: No obvious rashes, dressing intact on R wrist, left chest wall and right groin, no evidence of bleeding or tenderness at sites  Lungs: Course in anterior fields, no rales, rhonchi or wheezes posteriorly  CV: RRR no m/r/g  GI: Soft, NT, ND  : Fernández present with adequate drainage  Ext: no obvious swelling  Neuro: grossly intact hand grasp and able to move toes bilaterally  Psych: awake, alert      Laboratories:  Leukocytosis downtrending, hemoglobin at 10.7 (normocytic), thrombocytosis improving  FSGs between 110s-200  Creatinine peak at 1.64  Elevated alk phos of 135  Troponins elevated at 0.08, most recent lactate 1.0      Imaging:  Xray Chest 1 View AP/PA (03.15.18 @ 22:44)   The heart is normal in size. Right pleural effusion. Mild pulmonary   vascular congestion. A pacer was placed and it appears to be in good   position. No pneumothorax. Endotracheal tube and NG tube are in good   position as well      Assessment and Plan:  79 yo Greenlandic speaking M PMHx DMT2, HTN, R endarterectomy (7/2017) c/b bleed, partial SBO, admission 10/2017 with septic shock and respiratory failure c/ chest tube and pleurodesis, admitted Jan 2018 for anemia s/p emergent EGD (hg of 5.4) with evidence duodenal ulcers/duodenitis/sessile duodenal polyp w/ elevated troponins, renal failure, CXR concerning for persistent RLL process and effusion, paroxysmal Afib/flutter presenting from OSH as CCU transfer for ADHF in setting of ?hypertensive emergency c/b aflutter with variable block/junctional escape and polymorphic VT. Now s/p AICD requiring intubation for airway protection, no further episodes of VT. s/p LHC with 85% stenosis of proximal circ. and  of pRCA without intervention    # Hypoxic respiratory failure in setting of pulmonary edema 2/2 ADHF: Still requiring oxygen  - Continue on lasix at 40 IVP BID  - Net negative 1.3 L over last 24 hours  - Continue PRN duonebs    # Hypertension: Blood pressure currently 148/74, was up to 193/81 previously (may have been weaning to extubation at that time)  - Continue carvedilol 12.5 BID   - Will need further medical reconciliation in AM regarding home medications (in outpatient medication review on spironolactone, hydrochlorothiazide, valsartan and nifedipine, however valsartan-HCTZ and a few other of his medications may have been discontinued per paperwork from previous hospitalization).      # Headache:  - If persistent or new neuro deficits consider CT head. Not associated with hypertension at time of headache.     # Atrial fibrillation/atrial flutter:   - Rate well controlled (60s-90s)  - Will need to touch base with GI in AM to review endoscopy and colonoscopy reports due to concern that patient initially presented with low hemoglobin 5.4 requiring urgent EGD at previous hospitalization which was notable for hiatal hernia, duodenal ulcer and duodenitis. Colonoscopy was also performed at that time showing 8 mm sessile polyp.   - Will need to touch base with cardiology regarding plan for watchman    # CAD w/ proximal circ stenosis:  - Will need to touch base with cardiology in AM to clarify plan if PCI is indicated given 85% stenosis of proximal circumflex  - Continue high intensity statin    # PAULIE on likely on CKD:  - Downtrending creatinine  - Recently received contrast for cath and AICD    # Leukocytosis: Likely reactive in setting of stress and recent dose of solumedrol  - Downtrending  - Received antibiotics for procedure    # Glaucoma  - Timolol eye drops restarted  - Continue Cosopt, loteprednol and brimonidine    # T2DM: FSGs 100s-200s, well controlled  - HgA1c of 5.9   - Continue SSI    # Fernández   - Continue tamsulosin  - Consider trial of void tomorrow     Cynthia Menard MD  Internal Medicine, PGY-2  Pager [phone] Hospital Course:    79 yo Burundian speaking M PMHx DMT2, HTN, R endarterectomy (7/2017) c/b bleed, partial SBO, admission 10/2017 with septic shock and respiratory failure c/ chest tube and pleurodesis, admitted Jan 2018 for anemia s/p emergent EGD (hg of 5.4) with evidence duodenal ulcers/duodenitis/sessile duodenal polyp w/ elevated troponins, renal failure, CXR concerning for persistent RLL process and effusion, paroxysmal Afib/flutter presenting from OSH as CCU transfer for ADHF in setting of ?hypertensive emergency c/b aflutter with variable block/junctional escape and polymorphic VT.     History obtained through chart review.     Patient was very independent and in a good state of health until July when he had a R carotid endarterectomy which was complicated by bleeding resulting in a month long hospitalization at [place]. Since the event he has had about 2-3 hospitalizations from other complications followed by rehab stays. One of his episodes of GI bleed resulted in aspiration pneumonia, PAF, and kidney failure with prolonged hospitalization. Original echocardiogram and pharmacologic stress tests were negative. In the end of 12/2017 he had anemia from GI bleeding requiring urgent EGD showing gastritis and duodenal ulcers without active bleeding. There was no evidence of H pylori on biopsy. He also had a colonscopy with 8 mm descending colon sessile polyp. In January he was evaluated by Dr. Cabrera where he was noted to have palpitations. He had a holter with HR in 80s, first degree AVB and PAF. He was maintained on only plavix due to concern of GIB.     Most recently, the patient has been doing better and living at home with his wife. Wife reports he is able to walk around the house without difficulty and mainly only needs help changing. During the morning prior to admission he woke up feeling very short of breath so they called EMS and he was brought to [facility].     At presentation at OSH he had severe shortness of breath, with BP of 200/80, HR of 40-50. He was placed on BIPAP with PaO2 80 on CPAP 100%. He was given Duoneb, lasix 60mg IV, solumedrol 125mg and had notable diuresis per wife after placement of fernández.     During transit/on admission:  He was given magnesium for possible torsades. He was noted to have atrial flutter with complete heart block and intermittent narrow/wide complex escape rhythm. He also had 10-12 s of polymorphic VT, thought to be from prolonged QTc in setting of his bradycardia. He was transferred to the CCU for his ADHF and possible PPM.     While at the CCU:   He was noted to be hypertensive to 200s and was started on nitroglycerin drip. He sustained bradycardia in 30-40s with atrial flutter with junctional escape rhythm. He was evaluated by EP and TVP was held off. He was further diuresed and had notable elevations in cardiac enzymes likely from demand and elevated creatinine. He had multiple episodes of nonsustained VT, self-resolved. He had TTE (3/14) with overall preserved LV function (however suboptimal study), and cath (3/15) with 85% stenosis of proximal circumflex and 100% stenosis of proximal RCA (chronic total occlusion) recommending correlation if VT was rate associated, otherwise consider PCI. On 3/15 he had a dual chamber ICD placed. He was intubated due to agitation and was extubated the following day.     GI was consulted in anticipation of possible watchman. GI requested outside records (which are now in the chart) and to continue PPI and test for H pylori.     This morning:  History was obtained through wife at bedside as patient was Burundian speaking. Patient was endorsing severe headache for the last few hours. He appeared to be tachypneic and hypoxic (poor wave form) which improved to 90s after supplemental oxygen was replaced. His blood pressure at bedside was 116 systolic. He had a bowel movement two days previously and continued to have fernández in place. He denied shortness of breath and chest pain. 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  He had no difficulty swallowing.     Imaging:  TTE 3/14  1. Mitral annular calcification.  Thickened mitral valve leaflets. Mild mitral regurgitation.  2. Aortic valve not well visualized; appears calcified with normal opening. Peak transaortic valve gradient equals 10  mm Hg. Minimal aortic regurgitation.   3. Left atrium not well visualized; appears dilated.  4. Normal left ventricular internal dimensions.  5. Endocardial visualization enhanced with intravenous injection of echo contrast (Definity).  Endocardium not  well visualized despite the use of echo contrast; overall left ventricular systolic function appears preserved.  Paradoxical septal motion.  6. The right ventricle is not well visualized.  7. Estimated right ventricular systolic pressure equals 48 mm Hg, assuming right atrial pressure equals 8 mm Hg,  consistent with mild pulmonary hypertension.    Cath 3/15:  LM:   --  LM: Normal.  LAD:   --  LAD: Angiography showed minor luminal irregularities with no  flow limiting lesions.  CX:   --  Proximal circumflex: There was a tubular 85 % stenosis. There was  JUSTYN grade 3 flow through the vessel (brisk flow).  RCA:   --  Proximal RCA: There was a 100 % stenosis. This lesion is a  chronic total occlusion.  COMPLICATIONS: There were no complications.  DIAGNOSTIC RECOMMENDATIONS: Recommend EP correlation to determine if the VT  is rate related, then can consider PCI to the LCx.  INTERVENTIONAL RECOMMENDATIONS: Recommend EP correlation to determine if  the VT is rate related, then can consider PCI to the LCx.    4/2017- stress test neg for ischemia, at that time in NSR w/ RBBB  3/2017- echo showed normal LV function w/ moderate concentric hypertrophy      All: NKDA    Meds:  Plavix  Bacitracin  Tylenol for pain  Atorvastatin 40  Carvedilol 12.5 BID  Cosopt BID  Duoneb PRN Q6  Artificial tears  Brimonidine 0.2% BID  SSI  Lasix 40 IVP BID  Loteprednol 0.5% BID  Tamsulosin 0.4 qhs    Physical exam:  Vital Signs Last 24 Hrs  T(C): 36.7 (17 Mar 2018 04:24), Max: 37.2 (16 Mar 2018 19:00)  T(F): 98 (17 Mar 2018 04:24), Max: 98.9 (16 Mar 2018 19:00)  HR: 66 (17 Mar 2018 04:24) (66 - 102)  BP: 148/74 (17 Mar 2018 04:24) (123/60 - 193/81)  BP(mean): 112 (16 Mar 2018 21:00) (79 - 128)  RR: 18 (17 Mar 2018 04:24) (11 - 28)  SpO2: 98% (17 Mar 2018 04:24) (93% - 98%)    General: Awake, alert, Burundian speaking  HEENT: NCAT  Skin: No obvious rashes, dressing intact on R wrist, left chest wall and right groin, no evidence of bleeding or tenderness at sites  Lungs: Course in anterior fields, no rales, rhonchi or wheezes posteriorly  CV: RRR no m/r/g  GI: Soft, NT, ND  : Fernández present with adequate drainage  Ext: no obvious swelling  Neuro: grossly intact hand grasp and able to move toes bilaterally  Psych: awake, alert      Laboratories:  Leukocytosis downtrending, hemoglobin at 10.7 (normocytic), thrombocytosis improving  FSGs between 110s-200  Creatinine peak at 1.64  Elevated alk phos of 135  Troponins elevated at 0.08, most recent lactate 1.0      Imaging:  Xray Chest 1 View AP/PA (03.15.18 @ 22:44)   The heart is normal in size. Right pleural effusion. Mild pulmonary   vascular congestion. A pacer was placed and it appears to be in good   position. No pneumothorax. Endotracheal tube and NG tube are in good   position as well      Assessment and Plan:  79 yo Burundian speaking M PMHx DMT2, HTN, R endarterectomy (7/2017) c/b bleed, partial SBO, admission 10/2017 with septic shock and respiratory failure c/ chest tube and pleurodesis, admitted Jan 2018 for anemia s/p emergent EGD (hg of 5.4) with evidence duodenal ulcers/duodenitis/sessile duodenal polyp w/ elevated troponins, renal failure, CXR concerning for persistent RLL process and effusion, paroxysmal Afib/flutter presenting from OSH as CCU transfer for ADHF in setting of ?hypertensive emergency c/b aflutter with variable block/junctional escape and polymorphic VT. Now s/p AICD requiring intubation for airway protection, no further episodes of VT. s/p LHC with 85% stenosis of proximal circ. and  of pRCA without intervention    # Hypoxic respiratory failure in setting of pulmonary edema 2/2 ADHF: Still requiring oxygen  - Continue on lasix at 40 IVP BID  - Strict I/Os and daily weights  - Net negative 1.3 L over last 24 hours  - Continue PRN duonebs  - VBG with AM laboratories    # Hypertension: Blood pressure currently 148/74, was up to 193/81 previously (may have been weaning to extubation at that time)  - Continue carvedilol 12.5 BID   - Will need further medical reconciliation in AM regarding home medications (in outpatient medication review on spironolactone, hydrochlorothiazide, valsartan and nifedipine, however valsartan-HCTZ and a few other of his medications may have been discontinued per paperwork from previous hospitalization).      # Headache:  - If persistent or new neuro deficits consider CT head. Not associated with hypertension at time of headache.     # Atrial fibrillation/atrial flutter:   - Rate well controlled (60s-90s)  - Will need to touch base with GI in AM to review endoscopy and colonoscopy reports due to concern that patient initially presented with low hemoglobin 5.4 requiring urgent EGD at previous hospitalization which was notable for hiatal hernia, duodenal ulcer and duodenitis. Colonoscopy was also performed at that time showing 8 mm sessile polyp.   - Will need to touch base with cardiology regarding plan for watchman    # CAD w/ proximal circ stenosis:  - Will need to touch base with cardiology in AM to clarify plan if PCI is indicated given 85% stenosis of proximal circumflex  - Continue high intensity statin    # PAULIE on likely on CKD:  - Downtrending creatinine  - Recently received contrast for cath and AICD    # Leukocytosis: Likely reactive in setting of stress and recent dose of solumedrol  - Downtrending  - Received antibiotics for procedure    # Glaucoma  - Timolol eye drops restarted  - Continue Cosopt, loteprednol and brimonidine    # T2DM: FSGs 100s-200s, well controlled  - HgA1c of 5.9   - Continue SSI    # Fernández   - Continue tamsulosin  - Consider trial of void tomorrow     Cynthia Menard MD  Internal Medicine, PGY-2  Pager [phone] Hospital Course:    79 yo [place]wanan speaking M PMHx DMT2, HTN, R endarterectomy (7/2017) c/b bleed, partial SBO, admission 10/2017 with septic shock and respiratory failure c/ chest tube and pleurodesis, admitted Jan 2018 for anemia s/p emergent EGD (hg of 5.4) with evidence duodenal ulcers/duodenitis/sessile duodenal polyp w/ elevated troponins, renal failure, CXR concerning for persistent RLL process and effusion, paroxysmal Afib/flutter presenting from OSH as CCU transfer for ADHF in setting of ?hypertensive emergency c/b aflutter with variable block/junctional escape and polymorphic VT.     History obtained through chart review.     Patient was very independent and in a good state of health until July when he had a R carotid endarterectomy which was complicated by bleeding resulting in a month long hospitalization at [facility]. Since the event he has had about 2-3 hospitalizations from other complications followed by rehab stays. One of his episodes of GI bleed resulted in aspiration pneumonia, PAF, and kidney failure with prolonged hospitalization. Original echocardiogram and pharmacologic stress tests were negative. In the end of 12/2017 he had anemia from GI bleeding requiring urgent EGD showing gastritis and duodenal ulcers without active bleeding. There was no evidence of H pylori on biopsy. He also had a colonscopy with 8 mm descending colon sessile polyp. In January he was evaluated by Dr. Cabrera where he was noted to have palpitations. He had a holter with HR in 80s, first degree AVB and PAF. He was maintained on only plavix due to concern of GIB.     Most recently, the patient has been doing better and living at home with his wife. Wife reports he is able to walk around the house without difficulty and mainly only needs help changing. During the morning prior to admission he woke up feeling very short of breath so they called EMS and he was brought to [facility].     At presentation at OSH he had severe shortness of breath, with BP of 200/80, HR of 40-50. He was placed on BIPAP with PaO2 80 on CPAP 100%. He was given Duoneb, lasix 60mg IV, solumedrol 125mg and had notable diuresis per wife after placement of fernández.     During transit/on admission:  He was given magnesium for possible torsades. He was noted to have atrial flutter with complete heart block and intermittent narrow/wide complex escape rhythm. He also had 10-12 s of polymorphic VT, thought to be from prolonged QTc in setting of his bradycardia. He was transferred to the CCU for his ADHF and possible PPM.     While at the CCU:   He was noted to be hypertensive to 200s and was started on nitroglycerin drip. He sustained bradycardia in 30-40s with atrial flutter with junctional escape rhythm. He was evaluated by EP and TVP was held off. He was further diuresed and had notable elevations in cardiac enzymes likely from demand and elevated creatinine. He had multiple episodes of nonsustained VT, self-resolved. He had TTE (3/14) with overall preserved LV function (however suboptimal study), and cath (3/15) with 85% stenosis of proximal circumflex and 100% stenosis of proximal RCA (chronic total occlusion) recommending correlation if VT was rate associated, otherwise consider PCI. On 3/15 he had a dual chamber ICD placed. He was intubated due to agitation and was extubated the following day.     GI was consulted in anticipation of possible watchman. GI requested outside records (which are now in the chart) and to continue PPI and test for H pylori.     This morning:  History was obtained through wife at bedside as patient was Botswanan speaking. Patient was endorsing severe headache for the last few hours. He appeared to be tachypneic and hypoxic (poor wave form) which improved to 90s after supplemental oxygen was replaced. His blood pressure at bedside was 116 systolic. He had a bowel movement two days previously and continued to have fernández in place. He denied shortness of breath and chest pain. 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  He had no difficulty swallowing.     Imaging:  TTE 3/14  1. Mitral annular calcification.  Thickened mitral valve leaflets. Mild mitral regurgitation.  2. Aortic valve not well visualized; appears calcified with normal opening. Peak transaortic valve gradient equals 10  mm Hg. Minimal aortic regurgitation.   3. Left atrium not well visualized; appears dilated.  4. Normal left ventricular internal dimensions.  5. Endocardial visualization enhanced with intravenous injection of echo contrast (Definity).  Endocardium not  well visualized despite the use of echo contrast; overall left ventricular systolic function appears preserved.  Paradoxical septal motion.  6. The right ventricle is not well visualized.  7. Estimated right ventricular systolic pressure equals 48 mm Hg, assuming right atrial pressure equals 8 mm Hg,  consistent with mild pulmonary hypertension.    Cath 3/15:  LM:   --  LM: Normal.  LAD:   --  LAD: Angiography showed minor luminal irregularities with no  flow limiting lesions.  CX:   --  Proximal circumflex: There was a tubular 85 % stenosis. There was  JUSTYN grade 3 flow through the vessel (brisk flow).  RCA:   --  Proximal RCA: There was a 100 % stenosis. This lesion is a  chronic total occlusion.  COMPLICATIONS: There were no complications.  DIAGNOSTIC RECOMMENDATIONS: Recommend EP correlation to determine if the VT  is rate related, then can consider PCI to the LCx.  INTERVENTIONAL RECOMMENDATIONS: Recommend EP correlation to determine if  the VT is rate related, then can consider PCI to the LCx.    4/2017- stress test neg for ischemia, at that time in NSR w/ RBBB  3/2017- echo showed normal LV function w/ moderate concentric hypertrophy      All: NKDA    Meds:  Plavix  Bacitracin  Tylenol for pain  Atorvastatin 40  Carvedilol 12.5 BID  Cosopt BID  Duoneb PRN Q6  Artificial tears  Brimonidine 0.2% BID  SSI  Lasix 40 IVP BID  Loteprednol 0.5% BID  Tamsulosin 0.4 qhs    Physical exam:  Vital Signs Last 24 Hrs  T(C): 36.7 (17 Mar 2018 04:24), Max: 37.2 (16 Mar 2018 19:00)  T(F): 98 (17 Mar 2018 04:24), Max: 98.9 (16 Mar 2018 19:00)  HR: 66 (17 Mar 2018 04:24) (66 - 102)  BP: 148/74 (17 Mar 2018 04:24) (123/60 - 193/81)  BP(mean): 112 (16 Mar 2018 21:00) (79 - 128)  RR: 18 (17 Mar 2018 04:24) (11 - 28)  SpO2: 98% (17 Mar 2018 04:24) (93% - 98%)    General: Awake, alert, B[place]wanan speaking  HEENT: NCAT  Skin: No obvious rashes, dressing intact on R wrist, left chest wall and right groin, no evidence of bleeding or tenderness at sites  Lungs: Course in anterior fields, no rales, rhonchi or wheezes posteriorly  CV: RRR no m/r/g  GI: Soft, NT, ND  : Fernández present with adequate drainage  Ext: no obvious swelling  Neuro: grossly intact hand grasp and able to move toes bilaterally  Psych: awake, alert      Laboratories:  Leukocytosis downtrending, hemoglobin at 10.7 (normocytic), thrombocytosis improving  FSGs between 110s-200  Creatinine peak at 1.64  Elevated alk phos of 135  Troponins elevated at 0.08, most recent lactate 1.0      Imaging:  Xray Chest 1 View AP/PA (03.15.18 @ 22:44)   The heart is normal in size. Right pleural effusion. Mild pulmonary   vascular congestion. A pacer was placed and it appears to be in good   position. No pneumothorax. Endotracheal tube and NG tube are in good   position as well      Assessment and Plan:  79 yo [place]wanan speaking M PMHx DMT2, HTN, R endarterectomy (7/2017) c/b bleed, partial SBO, admission 10/2017 with septic shock and respiratory failure c/ chest tube and pleurodesis, admitted Jan 2018 for anemia s/p emergent EGD (hg of 5.4) with evidence duodenal ulcers/duodenitis/sessile duodenal polyp w/ elevated troponins, renal failure, CXR concerning for persistent RLL process and effusion, paroxysmal Afib/flutter presenting from OSH as CCU transfer for ADHF in setting of ?hypertensive emergency c/b aflutter with variable block/junctional escape and polymorphic VT. Now s/p AICD requiring intubation for airway protection, no further episodes of VT. s/p LHC with 85% stenosis of proximal circ. and  of pRCA without intervention    # Hypoxic respiratory failure in setting of pulmonary edema 2/2 ADHF: Still requiring oxygen, however given grossly normal TTE cannot fully exclude parapneumonic effusion. Possible history of chest tube and pleurodesis in past. Also previously seen RLL opacity and effusion d/c on levaquin per outside records  - CT chest  - Consider pulmonary consult in AM  - Continue on lasix at 40 IVP BID  - Strict I/Os and daily weights  - Net negative 1.3 L over last 24 hours  - Continue PRN duonebs  - VBG with AM laboratories    # Hypertension: Blood pressure currently 148/74, was up to 193/81 previously (may have been weaning to extubation at that time)  - Continue carvedilol 12.5 BID   - Consider restarting HCTZ at 12.5 or nifedipine if requires additional blood pressure control  - Continue to hold ARB and spironolactone in setting of PAULIE  - Will need further medical reconciliation in AM regarding home medications (in outpatient medication review on spironolactone, hydrochlorothiazide, valsartan and nifedipine, however valsartan-HCTZ and a few other of his medications may have been discontinued per paperwork from previous hospitalization).      # Headache:  - If persistent or new neuro deficits consider CT head. Not associated with hypertension at time of headache.   - Tylenol PRN    # Atrial fibrillation/atrial flutter:   - Rate well controlled (60s-90s)  - Will need to touch base with GI in AM to review endoscopy and colonoscopy reports due to concern that patient initially presented with low hemoglobin 5.4 requiring urgent EGD at previous hospitalization which was notable for hiatal hernia, duodenal ulcer and duodenitis. Colonoscopy was also performed at that time showing 8 mm sessile polyp. (Ie procedure preceded bleeding event rather than as a complication).   - Will need to touch base with cardiology regarding plan for watchman    # Prior duodenal ulcer/gastritis c/b previous melena:  - Please see physical chart for review of outside medical records  - Starting PPI protonix 40  - H pylori stool antigen and antibody (no evidence of H pylori on pathology report however)  - Iron studies per GI    # CAD w/ proximal circ stenosis:  - Will need to touch base with cardiology in AM to clarify plan if PCI is indicated given 85% stenosis of proximal circumflex  - Continue high intensity statin  - Continue plavix    # PAULIE on likely on CKD:  - Downtrending creatinine  - Recently received contrast for cath and AICD    # Leukocytosis: Likely reactive in setting of stress and recent dose of solumedrol, however cannot fully exclude infectious etiology given RLL effusion  - CT Chest  - Downtrending  - Received antibiotics for procedure    # Glaucoma  - Timolol eye drops restarted  - Continue Cosopt, loteprednol and brimonidine    # T2DM: FSGs 100s-200s, well controlled  - HgA1c of 5.9   - Continue SSI    # Fernández   - Continue tamsulosin  - Consider trial of void tomorrow     Cynthia Menard MD  Internal Medicine, PGY-2  Pager [phone] Hospital Course:    81 yo Vietnamese speaking M PMHx DMT2, HTN, R endarterectomy (7/2017) c/b bleed, partial SBO, admission 10/2017 with septic shock and respiratory failure c/ chest tube and pleurodesis, admitted Jan 2018 for anemia s/p emergent EGD (hg of 5.4) with evidence duodenal ulcers/duodenitis/sessile duodenal polyp w/ elevated troponins, renal failure, CXR concerning for persistent RLL process and effusion, paroxysmal Afib/flutter presenting from OSH as CCU transfer for ADHF in setting of ?hypertensive emergency c/b aflutter with variable block/junctional escape and polymorphic VT.     History obtained through chart review.     Patient was very independent and in a good state of health until July when he had a R carotid endarterectomy which was complicated by bleeding resulting in a month long hospitalization at [facility]. Since the event he has had about 2-3 hospitalizations from other complications followed by rehab stays. One of his episodes of GI bleed resulted in aspiration pneumonia, PAF, and kidney failure with prolonged hospitalization. Original echocardiogram and pharmacologic stress tests were negative. In the end of 12/2017 he had anemia from GI bleeding requiring urgent EGD showing gastritis and duodenal ulcers without active bleeding. There was no evidence of H pylori on biopsy. He also had a colonscopy with 8 mm descending colon sessile polyp. In January he was evaluated by Dr. Cabrera where he was noted to have palpitations. He had a holter with HR in 80s, first degree AVB and PAF. He was maintained on only plavix due to concern of GIB.     Most recently, the patient has been doing better and living at home with his wife. Wife reports he is able to walk around the house without difficulty and mainly only needs help changing. During the morning prior to admission he woke up feeling very short of breath so they called EMS and he was brought to [facility].     At presentation at OSH he had severe shortness of breath, with BP of 200/80, HR of 40-50. He was placed on BIPAP with PaO2 80 on CPAP 100%. He was given Duoneb, lasix 60mg IV, solumedrol 125mg and had notable diuresis per wife after placement of fernández.     During transit/on admission:  He was given magnesium for possible torsades. He was noted to have atrial flutter with complete heart block and intermittent narrow/wide complex escape rhythm. He also had 10-12 s of polymorphic VT, thought to be from prolonged QTc in setting of his bradycardia. He was transferred to the CCU for his ADHF and possible PPM.     While at the CCU:   He was noted to be hypertensive to 200s and was started on nitroglycerin drip. He sustained bradycardia in 30-40s with atrial flutter with junctional escape rhythm. He was evaluated by EP and TVP was held off. He was further diuresed and had notable elevations in cardiac enzymes likely from demand and elevated creatinine. He had multiple episodes of nonsustained VT, self-resolved. He had TTE (3/14) with overall preserved LV function (however suboptimal study), and cath (3/15) with 85% stenosis of proximal circumflex and 100% stenosis of proximal RCA (chronic total occlusion) recommending correlation if VT was rate associated, otherwise consider PCI. On 3/15 he had a dual chamber ICD placed. He was intubated due to agitation and was extubated the following day.     GI was consulted in anticipation of possible watchman. GI requested outside records (which are now in the chart) and to continue PPI and test for H pylori.     This morning:  History was obtained through wife at bedside as patient was Vietnamese speaking. Patient was endorsing severe headache for the last few hours. He appeared to be tachypneic and hypoxic (poor wave form) which improved to 90s after supplemental oxygen was replaced. His blood pressure at bedside was 116 systolic. He had a bowel movement two days previously and continued to have fernández in place. He denied shortness of breath and chest pain. 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  He had no difficulty swallowing.     Imaging:  TTE 3/14  1. Mitral annular calcification.  Thickened mitral valve leaflets. Mild mitral regurgitation.  2. Aortic valve not well visualized; appears calcified with normal opening. Peak transaortic valve gradient equals 10  mm Hg. Minimal aortic regurgitation.   3. Left atrium not well visualized; appears dilated.  4. Normal left ventricular internal dimensions.  5. Endocardial visualization enhanced with intravenous injection of echo contrast (Definity).  Endocardium not  well visualized despite the use of echo contrast; overall left ventricular systolic function appears preserved.  Paradoxical septal motion.  6. The right ventricle is not well visualized.  7. Estimated right ventricular systolic pressure equals 48 mm Hg, assuming right atrial pressure equals 8 mm Hg,  consistent with mild pulmonary hypertension.    Cath 3/15:  LM:   --  LM: Normal.  LAD:   --  LAD: Angiography showed minor luminal irregularities with no  flow limiting lesions.  CX:   --  Proximal circumflex: There was a tubular 85 % stenosis. There was  JUSTYN grade 3 flow through the vessel (brisk flow).  RCA:   --  Proximal RCA: There was a 100 % stenosis. This lesion is a  chronic total occlusion.  COMPLICATIONS: There were no complications.  DIAGNOSTIC RECOMMENDATIONS: Recommend EP correlation to determine if the VT  is rate related, then can consider PCI to the LCx.  INTERVENTIONAL RECOMMENDATIONS: Recommend EP correlation to determine if  the VT is rate related, then can consider PCI to the LCx.    4/2017- stress test neg for ischemia, at that time in NSR w/ RBBB  3/2017- echo showed normal LV function w/ moderate concentric hypertrophy      All: NKDA    Meds:  Plavix  Bacitracin  Tylenol for pain  Atorvastatin 40  Carvedilol 12.5 BID  Cosopt BID  Duoneb PRN Q6  Artificial tears  Brimonidine 0.2% BID  SSI  Lasix 40 IVP BID  Loteprednol 0.5% BID  Tamsulosin 0.4 qhs    Physical exam:  Vital Signs Last 24 Hrs  T(C): 36.7 (17 Mar 2018 04:24), Max: 37.2 (16 Mar 2018 19:00)  T(F): 98 (17 Mar 2018 04:24), Max: 98.9 (16 Mar 2018 19:00)  HR: 66 (17 Mar 2018 04:24) (66 - 102)  BP: 148/74 (17 Mar 2018 04:24) (123/60 - 193/81)  BP(mean): 112 (16 Mar 2018 21:00) (79 - 128)  RR: 18 (17 Mar 2018 04:24) (11 - 28)  SpO2: 98% (17 Mar 2018 04:24) (93% - 98%)    General: Awake, alert, Vietnamese speaking  HEENT: NCAT  Skin: No obvious rashes, dressing intact on R wrist, left chest wall and right groin, no evidence of bleeding or tenderness at sites  Lungs: Course in anterior fields, no rales, rhonchi or wheezes posteriorly  CV: RRR no m/r/g  GI: Soft, NT, ND  : Fernández present with adequate drainage  Ext: no obvious swelling  Neuro: grossly intact hand grasp and able to move toes bilaterally  Psych: awake, alert    Labs, tracing, imagin personally reviewed.    Laboratories:  Leukocytosis downtrending, hemoglobin at 10.7 (normocytic), thrombocytosis improving  FSGs between 110s-200  Creatinine peak at 1.64  Elevated alk phos of 135  Troponins elevated at 0.08, most recent lactate 1.0      Imaging:  Xray Chest 1 View AP/PA (03.15.18 @ 22:44)   The heart is normal in size. Right pleural effusion. Mild pulmonary   vascular congestion. A pacer was placed and it appears to be in good   position. No pneumothorax. Endotracheal tube and NG tube are in good   position as well    Assessment and Plan:  81 yo Vietnamese speaking M PMHx DMT2, HTN, R endarterectomy (7/2017) c/b bleed, partial SBO, admission 10/2017 with septic shock and respiratory failure c/ chest tube and pleurodesis, admitted Jan 2018 for anemia s/p emergent EGD (hg of 5.4) with evidence duodenal ulcers/duodenitis/sessile duodenal polyp w/ elevated troponins, renal failure, CXR concerning for persistent RLL process and effusion, paroxysmal Afib/flutter presenting from OSH as CCU transfer for ADHF in setting of ?hypertensive emergency c/b aflutter with variable block/junctional escape and polymorphic VT. Now s/p AICD requiring intubation for airway protection, no further episodes of VT. s/p LHC with 85% stenosis of proximal circ. and  of pRCA without intervention    # Hypoxic respiratory failure in setting of pulmonary edema 2/2 ADHF: Still requiring oxygen, however given grossly normal TTE cannot fully exclude parapneumonic effusion. Possible history of chest tube and pleurodesis in past. Also previously seen RLL opacity and effusion d/c on levaquin per outside records  - CT chest  - Consider pulmonary consult in AM  - Continue on lasix at 40 IVP BID  - Strict I/Os and daily weights  - Net negative 1.3 L over last 24 hours  - Continue PRN duonebs  - VBG with AM laboratories    # Hypertension: Blood pressure currently 148/74, was up to 193/81 previously (may have been weaning to extubation at that time)  - Continue carvedilol 12.5 BID   - Consider restarting HCTZ at 12.5 or nifedipine if requires additional blood pressure control  - Continue to hold ARB and spironolactone in setting of PAULIE  - Will need further medical reconciliation in AM regarding home medications (in outpatient medication review on spironolactone, hydrochlorothiazide, valsartan and nifedipine, however valsartan-HCTZ and a few other of his medications may have been discontinued per paperwork from previous hospitalization).      # Headache:  - If persistent or new neuro deficits consider CT head. Not associated with hypertension at time of headache.   - Tylenol PRN    # Atrial fibrillation/atrial flutter:   - Rate well controlled (60s-90s)  - Will need to touch base with GI in AM to review endoscopy and colonoscopy reports due to concern that patient initially presented with low hemoglobin 5.4 requiring urgent EGD at previous hospitalization which was notable for hiatal hernia, duodenal ulcer and duodenitis. Colonoscopy was also performed at that time showing 8 mm sessile polyp. (Ie procedure preceded bleeding event rather than as a complication).   - Will need to touch base with cardiology regarding plan for watchman    # Prior duodenal ulcer/gastritis c/b previous melena:  - Please see physical chart for review of outside medical records  - Starting PPI protonix 40  - H pylori stool antigen and antibody (no evidence of H pylori on pathology report however)  - Iron studies per GI    # CAD w/ proximal circ stenosis:  - Will need to touch base with cardiology in AM to clarify plan if PCI is indicated given 85% stenosis of proximal circumflex  - Continue high intensity statin  - Continue plavix    # PAULIE on likely on CKD:  - Downtrending creatinine  - Recently received contrast for cath and AICD    # Leukocytosis: Likely reactive in setting of stress and recent dose of solumedrol, however cannot fully exclude infectious etiology given RLL effusion  - CT Chest  - Downtrending  - Received antibiotics for procedure    # Glaucoma  - Timolol eye drops restarted  - Continue Cosopt, loteprednol and brimonidine    # T2DM: FSGs 100s-200s, well controlled  - HgA1c of 5.9   - Continue SSI    # Fernández   - Continue tamsulosin  - Consider trial of void tomorrow     Cynthia Menard MD  Internal Medicine, PGY-2  Pager [phone]    agree with excellent PGY note with the following additions:  This is an 80 year old Thai gentleman with multiple recent hospitalizations including endarterectomy c/b bleed, upper GI bleed, and pleural effusion requiring chest tube and pleurodesis who initially presented to OSH with hyposic respiratory failure, transferred to [facility] CCU for further management, now being transferred to floor.  Hospitalization notable for episode of polymorphic Vtach en route which required Mg, hypertensive urgency treated with nitro drip.  Initial hypoxia thought to be 2/2 ADHF, patient was diuresed and weaned off O2. He underwent cardiac cath which showed complete proximal RCA occlusion, and 85% Lcirc occlusion, no intervention undergone.  He ultimately underwent AICD placement given VT. Anticoagulation was addressed, but given history of significant GI bleed, GI was consulted and anticoagulation deferred.    --continue lasix for now, though patient relatively euvolemic  --CXR shows large R sided pleural effusion, which is likely playing a large role in persistent O2 requirement. Will send CT chest to better assess.   --leukocytosis is likely reactive/2/2 solumedrol, though given hypoxia, leukocytosis and large pleural effusion, should consider infectious etiology of effusion, CT as above  --unclear what was driving initial HTN, as patient is currently only mildly HTN on a single agent. Continue carvedilol for now.   --appreciate GI and cards c/s, should address further anticoagulation with both teams now that GI history is in chart    I was physically present for the key portions of the evaluation and management (E/M) service provided.  I agree with the above history, physical, and plan which I have reviewed and edited where appropriate.     70 minutes spent on total encounter; more than 50% of the visit was spent counseling and/or coordinating care by the attending physician. Hospital Course:    79 yo Omani speaking M PMHx DMT2, HTN, R endarterectomy (7/2017) c/b bleed, partial SBO, admission 10/2017 with septic shock and respiratory failure c/ chest tube and pleurodesis, admitted Jan 2018 for anemia s/p emergent EGD (hg of 5.4) with evidence duodenal ulcers/duodenitis/sessile duodenal polyp w/ elevated troponins, renal failure, CXR concerning for persistent RLL process and effusion, paroxysmal Afib/flutter presenting from OSH as CCU transfer for ADHF in setting of ?hypertensive emergency c/b aflutter with variable block/junctional escape and polymorphic VT.     History obtained through chart review.     Patient was very independent and in a good state of health until July when he had a R carotid endarterectomy which was complicated by bleeding resulting in a month long hospitalization at [place]. Since the event he has had about 2-3 hospitalizations from other complications followed by rehab stays. One of his episodes of GI bleed resulted in aspiration pneumonia, PAF, and kidney failure with prolonged hospitalization. Original echocardiogram and pharmacologic stress tests were negative. In the end of 12/2017 he had anemia from GI bleeding requiring urgent EGD showing gastritis and duodenal ulcers without active bleeding. There was no evidence of H pylori on biopsy. He also had a colonscopy with 8 mm descending colon sessile polyp. In January he was evaluated by Dr. Cabrera where he was noted to have palpitations. He had a holter with HR in 80s, first degree AVB and PAF. He was maintained on only plavix due to concern of GIB.     Most recently, the patient has been doing better and living at home with his wife. Wife reports he is able to walk around the house without difficulty and mainly only needs help changing. During the morning prior to admission he woke up feeling very short of breath so they called EMS and he was brought to [facility].     At presentation at OSH he had severe shortness of breath, with BP of 200/80, HR of 40-50. He was placed on BIPAP with PaO2 80 on CPAP 100%. He was given Duoneb, lasix 60mg IV, solumedrol 125mg and had notable diuresis per wife after placement of fernández.     During transit/on admission:  He was given magnesium for possible torsades. He was noted to have atrial flutter with complete heart block and intermittent narrow/wide complex escape rhythm. He also had 10-12 s of polymorphic VT, thought to be from prolonged QTc in setting of his bradycardia. He was transferred to the CCU for his ADHF and possible PPM.     While at the CCU:   He was noted to be hypertensive to 200s and was started on nitroglycerin drip. He sustained bradycardia in 30-40s with atrial flutter with junctional escape rhythm. He was evaluated by EP and TVP was held off. He was further diuresed and had notable elevations in cardiac enzymes likely from demand and elevated creatinine. He had multiple episodes of nonsustained VT, self-resolved. He had TTE (3/14) with overall preserved LV function (however suboptimal study), and cath (3/15) with 85% stenosis of proximal circumflex and 100% stenosis of proximal RCA (chronic total occlusion) recommending correlation if VT was rate associated, otherwise consider PCI. On 3/15 he had a dual chamber ICD placed. He was intubated due to agitation and was extubated the following day.     GI was consulted in anticipation of possible watchman. GI requested outside records (which are now in the chart) and to continue PPI and test for H pylori.     This morning:  History was obtained through wife at bedside as patient was Omani speaking. Patient was endorsing severe headache for the last few hours. He appeared to be tachypneic and hypoxic (poor wave form) which improved to 90s after supplemental oxygen was replaced. His blood pressure at bedside was 116 systolic. He had a bowel movement two days previously and continued to have fernández in place. He denied shortness of breath and chest pain. 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  He had no difficulty swallowing.     Imaging:  TTE 3/14  1. Mitral annular calcification.  Thickened mitral valve leaflets. Mild mitral regurgitation.  2. Aortic valve not well visualized; appears calcified with normal opening. Peak transaortic valve gradient equals 10  mm Hg. Minimal aortic regurgitation.   3. Left atrium not well visualized; appears dilated.  4. Normal left ventricular internal dimensions.  5. Endocardial visualization enhanced with intravenous injection of echo contrast (Definity).  Endocardium not  well visualized despite the use of echo contrast; overall left ventricular systolic function appears preserved.  Paradoxical septal motion.  6. The right ventricle is not well visualized.  7. Estimated right ventricular systolic pressure equals 48 mm Hg, assuming right atrial pressure equals 8 mm Hg,  consistent with mild pulmonary hypertension.    Cath 3/15:  LM:   --  LM: Normal.  LAD:   --  LAD: Angiography showed minor luminal irregularities with no  flow limiting lesions.  CX:   --  Proximal circumflex: There was a tubular 85 % stenosis. There was  JUSTYN grade 3 flow through the vessel (brisk flow).  RCA:   --  Proximal RCA: There was a 100 % stenosis. This lesion is a  chronic total occlusion.  COMPLICATIONS: There were no complications.  DIAGNOSTIC RECOMMENDATIONS: Recommend EP correlation to determine if the VT  is rate related, then can consider PCI to the LCx.  INTERVENTIONAL RECOMMENDATIONS: Recommend EP correlation to determine if  the VT is rate related, then can consider PCI to the LCx.    4/2017- stress test neg for ischemia, at that time in NSR w/ RBBB  3/2017- echo showed normal LV function w/ moderate concentric hypertrophy      All: NKDA    Meds:  Plavix  Bacitracin  Tylenol for pain  Atorvastatin 40  Carvedilol 12.5 BID  Cosopt BID  Duoneb PRN Q6  Artificial tears  Brimonidine 0.2% BID  SSI  Lasix 40 IVP BID  Loteprednol 0.5% BID  Tamsulosin 0.4 qhs    Social history: Significant smoking history, quit 12 years ago, social drinker, no illicit drugs    Family hx: Noncontributory    Physical exam:  Vital Signs Last 24 Hrs  T(C): 36.7 (17 Mar 2018 04:24), Max: 37.2 (16 Mar 2018 19:00)  T(F): 98 (17 Mar 2018 04:24), Max: 98.9 (16 Mar 2018 19:00)  HR: 66 (17 Mar 2018 04:24) (66 - 102)  BP: 148/74 (17 Mar 2018 04:24) (123/60 - 193/81)  BP(mean): 112 (16 Mar 2018 21:00) (79 - 128)  RR: 18 (17 Mar 2018 04:24) (11 - 28)  SpO2: 98% (17 Mar 2018 04:24) (93% - 98%)    General: Awake, alert, Omani speaking  HEENT: NCAT  Skin: No obvious rashes, dressing intact on R wrist, left chest wall and right groin, no evidence of bleeding or tenderness at sites  Lungs: Course in anterior fields, no rales, rhonchi or wheezes posteriorly  CV: RRR no m/r/g  GI: Soft, NT, ND  : Fernández present with adequate drainage  Ext: no obvious swelling  Neuro: grossly intact hand grasp and able to move toes bilaterally  Psych: awake, alert    Labs, tracing, imagin personally reviewed.    Laboratories:  Leukocytosis downtrending, hemoglobin at 10.7 (normocytic), thrombocytosis improving  FSGs between 110s-200  Creatinine peak at 1.64  Elevated alk phos of 135  Troponins elevated at 0.08, most recent lactate 1.0      Imaging:  Xray Chest 1 View AP/PA (03.15.18 @ 22:44)   The heart is normal in size. Right pleural effusion. Mild pulmonary   vascular congestion. A pacer was placed and it appears to be in good   position. No pneumothorax. Endotracheal tube and NG tube are in good   position as well    Assessment and Plan:  79 yo Omani speaking M PMHx DMT2, HTN, R endarterectomy (7/2017) c/b bleed, partial SBO, admission 10/2017 with septic shock and respiratory failure c/ chest tube and pleurodesis, admitted Jan 2018 for anemia s/p emergent EGD (hg of 5.4) with evidence duodenal ulcers/duodenitis/sessile duodenal polyp w/ elevated troponins, renal failure, CXR concerning for persistent RLL process and effusion, paroxysmal Afib/flutter presenting from OSH as CCU transfer for ADHF in setting of ?hypertensive emergency c/b aflutter with variable block/junctional escape and polymorphic VT. Now s/p AICD requiring intubation for airway protection, no further episodes of VT. s/p LHC with 85% stenosis of proximal circ. and  of pRCA without intervention    # Hypoxic respiratory failure in setting of pulmonary edema 2/2 ADHF: Still requiring oxygen, however given grossly normal TTE cannot fully exclude parapneumonic effusion. Possible history of chest tube and pleurodesis in past. Also previously seen RLL opacity and effusion d/c on levaquin per outside records  - CT chest  - Consider pulmonary consult in AM  - Continue on lasix at 40 IVP BID  - Strict I/Os and daily weights  - Net negative 1.3 L over last 24 hours  - Continue PRN duonebs  - VBG with AM laboratories    # Hypertension: Blood pressure currently 148/74, was up to 193/81 previously (may have been weaning to extubation at that time)  - Continue carvedilol 12.5 BID   - Consider restarting HCTZ at 12.5 or nifedipine if requires additional blood pressure control  - Continue to hold ARB and spironolactone in setting of PAULIE  - Will need further medical reconciliation in AM regarding home medications (in outpatient medication review on spironolactone, hydrochlorothiazide, valsartan and nifedipine, however valsartan-HCTZ and a few other of his medications may have been discontinued per paperwork from previous hospitalization).      # Headache:  - If persistent or new neuro deficits consider CT head. Not associated with hypertension at time of headache.   - Tylenol PRN    # Atrial fibrillation/atrial flutter:   - Rate well controlled (60s-90s)  - Will need to touch base with GI in AM to review endoscopy and colonoscopy reports due to concern that patient initially presented with low hemoglobin 5.4 requiring urgent EGD at previous hospitalization which was notable for hiatal hernia, duodenal ulcer and duodenitis. Colonoscopy was also performed at that time showing 8 mm sessile polyp. (Ie procedure preceded bleeding event rather than as a complication).   - Will need to touch base with cardiology regarding plan for watchman    # Prior duodenal ulcer/gastritis c/b previous melena:  - Please see physical chart for review of outside medical records  - Starting PPI protonix 40  - H pylori stool antigen and antibody (no evidence of H pylori on pathology report however)  - Iron studies per GI    # CAD w/ proximal circ stenosis:  - Will need to touch base with cardiology in AM to clarify plan if PCI is indicated given 85% stenosis of proximal circumflex  - Continue high intensity statin  - Continue plavix    # PAULIE on likely on CKD:  - Downtrending creatinine  - Recently received contrast for cath and AICD    # Leukocytosis: Likely reactive in setting of stress and recent dose of solumedrol, however cannot fully exclude infectious etiology given RLL effusion  - CT Chest  - Downtrending  - Received antibiotics for procedure    # Glaucoma  - Timolol eye drops restarted  - Continue Cosopt, loteprednol and brimonidine    # T2DM: FSGs 100s-200s, well controlled  - HgA1c of 5.9   - Continue SSI    # Fernández   - Continue tamsulosin  - Consider trial of void tomorrow     Cynthia Menard MD  Internal Medicine, PGY-2  Pager [phone]    agree with excellent PGY note with the following additions:  This is an 80 year old [place]egalese gentleman with multiple recent hospitalizations including endarterectomy c/b bleed, upper GI bleed, and pleural effusion requiring chest tube and pleurodesis who initially presented to OSH with hyposic respiratory failure, transferred to [facility] CCU for further management, now being transferred to floor.  Hospitalization notable for episode of polymorphic Vtach en route which required Mg, hypertensive urgency treated with nitro drip.  Initial hypoxia thought to be 2/2 ADHF, patient was diuresed and weaned off O2. He underwent cardiac cath which showed complete proximal RCA occlusion, and 85% Lcirc occlusion, no intervention undergone.  He ultimately underwent AICD placement given VT. Anticoagulation was addressed, but given history of significant GI bleed, GI was consulted and anticoagulation deferred.    --continue lasix for now, though patient relatively euvolemic  --CXR shows large R sided pleural effusion, which is likely playing a large role in persistent O2 requirement. Will send CT chest to better assess.   --leukocytosis is likely reactive/2/2 solumedrol, though given hypoxia, leukocytosis and large pleural effusion, should consider infectious etiology of effusion, CT as above  --unclear what was driving initial HTN, as patient is currently only mildly HTN on a single agent. Continue carvedilol for now.   --appreciate GI and cards c/s, should address further anticoagulation with both teams now that GI history is in chart    I was physically present for the key portions of the evaluation and management (E/M) service provided.  I agree with the above history, physical, and plan which I have reviewed and edited where appropriate.     70 minutes spent on total encounter; more than 50% of the visit was spent counseling and/or coordinating care by the attending physician. 25-Dec-2022 19:53

## 2022-12-25 NOTE — ED ADULT TRIAGE NOTE - CHIEF COMPLAINT QUOTE
Pt states he takes oxycodone 30mg prn and he states the bottle fell out of the car and he doesn't have his next appt until 12/29.

## 2022-12-25 NOTE — ED ADULT NURSE NOTE - OBJECTIVE STATEMENT
Patient c/o medication refill with appointment on 12/29. Patient denies chest pain, SOB, headache, dizziness and blurry vision.

## 2022-12-25 NOTE — ED PROVIDER NOTE - CLINICAL SUMMARY MEDICAL DECISION MAKING FREE TEXT BOX
42-year-old male with chronic back pain complaining of same usual back pain.  Patient claims he lost his oxycodone supply.  Exam unremarkable.  No signs or symptoms of cord compression or infection.  Neuro intact.  Check [facility].  Patient is prescribed oxycodone 30 mg 4 times daily.  Will give patient 1 dose of oxycodone here.  Will prescribe 2 doses.  Patient will need to follow-up with his regular doctor

## 2022-12-25 NOTE — ED PROVIDER NOTE - NEUROLOGICAL, MLM
Alert and oriented, no focal deficits, no motor or sensory deficits. normal LE sensation and strength. ambulatory

## 2022-12-25 NOTE — ED PROVIDER NOTE - PATIENT PORTAL LINK FT
You can access the FollowMyHealth Patient Portal offered by [facility] by registering at the following website: [email or web address]. By joining [employer]’s FollowMyHealth portal, you will also be able to view your health information using other applications (apps) compatible with our system.

## 2022-12-25 NOTE — ED PROVIDER NOTE - CPE EDP SKIN NORM
impaired balance/vc to stand upright , tried to encourage further amb pt decline despite benfit and encouragement only wsih to return to bed pebbles [place] aware and pCA/cognition/impaired postural control
normal...

## 2022-12-25 NOTE — ED ADULT NURSE NOTE - NSIMPLEMENTINTERV_GEN_ALL_ED
Implemented All Universal Safety Interventions:  [place] to call system. Call bell, personal items and telephone within reach. Instruct patient to call for assistance. Room bathroom lighting operational. Non-slip footwear when patient is off stretcher. Physically safe environment: no spills, clutter or unnecessary equipment. Stretcher in lowest position, wheels locked, appropriate side rails in place.

## 2022-12-25 NOTE — ED PROVIDER NOTE - NSFOLLOWUPINSTRUCTIONS_ED_ALL_ED_FT
- continue oxycodone as prescribed.   - speak with your spine/pain doctor in next 1-2 days for follow up and medication refill    Back Pain    WHAT YOU NEED TO KNOW:    Back pain is common. It can be caused by many conditions, such as arthritis or the breakdown of spinal discs. Your risk for back pain is increased by injuries, lack of activity, or repeated bending and twisting. You may feel sore or stiff on one or both sides of your back. The pain may spread to your buttocks or thighs.    DISCHARGE INSTRUCTIONS:    Return to the emergency department if:     You have pain, numbness, or weakness in one or both legs.    Your pain becomes so severe that you cannot walk.    You cannot control your urine or bowel movements.    You have severe back pain with chest pain.    You have severe back pain, nausea, and vomiting.    You have severe back pain that spreads to your side or genital area.    Contact your healthcare provider if:     You have back pain that does not get better with rest and pain medicine.    You have a fever    You have pain that worsens when you are on your back or when you rest.    You have pain that worsens when you cough or sneeze.    You lose weight without trying.    You have questions or concerns about your condition or care.    Medicines:     NSAIDs help decrease swelling and pain. This medicine is available with or without a doctor's order. NSAIDs can cause stomach bleeding or kidney problems in certain people. If you take blood thinner medicine, always ask your healthcare provider if NSAIDs are safe for you. Always read the medicine label and follow directions.    Acetaminophen decreases pain and fever. It is available without a doctor's order. Ask how much to take and how often to take it. Follow directions. Read the labels of all other medicines you are using to see if they also contain acetaminophen, or ask your doctor or pharmacist. Acetaminophen can cause liver damage if not taken correctly. Do not use more than 4 grams (4,000 milligrams) total of acetaminophen in one day.     Muscle relaxers help decrease muscle spasms and back pain.    Prescription pain medicine may be given. Ask your healthcare provider how to take this medicine safely. Some prescription pain medicines contain acetaminophen. Do not take other medicines that contain acetaminophen without talking to your healthcare provider. Too much acetaminophen may cause liver damage. Prescription pain medicine may cause constipation. Ask your healthcare provider how to prevent or treat constipation.     Take your medicine as directed. Contact your healthcare provider if you think your medicine is not helping or if you have side effects. Tell him or her if you are allergic to any medicine. Keep a list of the medicines, vitamins, and herbs you take. Include the amounts, and when and why you take them. Bring the list or the pill bottles to follow-up visits. Carry your medicine list with you in case of an emergency.    How to manage your back pain:     Apply ice on your back for 15 to 20 minutes every hour or as directed. Use an ice pack, or put crushed ice in a plastic bag. Cover it with a towel before you apply it to your skin. Ice helps prevent tissue damage and decreases pain.    Apply heat on your back for 20 to 30 minutes every 2 hours for as many days as directed. Heat helps decrease pain and muscle spasms.    Stay active as much as you can without causing more pain. Bed rest could make your back pain worse. Avoid heavy lifting until your pain is gone.    Go to physical therapy as directed. A physical therapist can teach you exercises to help improve movement and strength, and to decrease pain.    Follow up with your healthcare provider in 2 weeks, or as directed: Write down your questions so you remember to ask them during your visits

## 2022-12-25 NOTE — ED PROVIDER NOTE - OBJECTIVE STATEMENT
42-year-old male with history of chronic back pain complaining of chronic low back pains, sharp, severe, no radiation.  Similar to chronic pain.  No numbness weakness incontinence.  No known injury/trauma.  Patient states he lost his oxycodone supply.  Thinks he dropped it had a car at some point.  States he does not have an appointment for several days with his spine doctor.

## 2022-12-25 NOTE — ED PROVIDER NOTE - MUSCULOSKELETAL, MLM
Spine appears normal, range of motion is not limited, no muscle or joint tenderness.  mild L low back ttp. no discoloration

## 2023-03-16 ENCOUNTER — OFFICE (OUTPATIENT)
Dept: URBAN - METROPOLITAN AREA CLINIC 113 | Facility: CLINIC | Age: 43
Setting detail: OPHTHALMOLOGY
End: 2023-03-16
Payer: MEDICAID

## 2023-03-16 DIAGNOSIS — H25.13: ICD-10-CM

## 2023-03-16 DIAGNOSIS — H40.2233: ICD-10-CM

## 2023-03-16 DIAGNOSIS — H40.033: ICD-10-CM

## 2023-03-16 PROCEDURE — 99214 OFFICE O/P EST MOD 30 MIN: CPT | Performed by: OPHTHALMOLOGY

## 2023-03-16 ASSESSMENT — REFRACTION_AUTOREFRACTION
OD_CYLINDER: -1.00
OD_SPHERE: -0.50
OD_AXIS: 114

## 2023-03-16 ASSESSMENT — KERATOMETRY
OD_K2POWER_DIOPTERS: 46.00
METHOD_AUTO_MANUAL: AUTO
OD_AXISANGLE_DEGREES: 046
OD_K1POWER_DIOPTERS: 45.00

## 2023-03-16 ASSESSMENT — AXIALLENGTH_DERIVED: OD_AL: 23.2519

## 2023-03-16 ASSESSMENT — PACHYMETRY
OS_CT_CORRECTION: -3
OS_CT_UM: 587

## 2023-03-16 ASSESSMENT — TONOMETRY: OD_IOP_MMHG: 12

## 2023-03-16 ASSESSMENT — CONFRONTATIONAL VISUAL FIELD TEST (CVF)
OD_FINDINGS: FULL
OS_FINDINGS: FULL

## 2023-03-16 ASSESSMENT — CORNEAL EDEMA CLINICAL DESCRIPTION: OS_CORNEALEDEMA: 4+

## 2023-03-16 ASSESSMENT — REFRACTION_MANIFEST
OS_AXIS: 065
OD_AXIS: 110
OD_VA1: 20/20
OS_VA1: 20/50
OS_CYLINDER: -1.50
OD_CYLINDER: -1.25
OD_SPHERE: PLANO
OS_SPHERE: PLANO

## 2023-03-16 ASSESSMENT — VISUAL ACUITY
OS_BCVA: 20/25+1
OD_BCVA: LP

## 2023-03-16 ASSESSMENT — SPHEQUIV_DERIVED: OD_SPHEQUIV: -1

## 2023-03-28 ENCOUNTER — ASC (OUTPATIENT)
Dept: URBAN - METROPOLITAN AREA SURGERY 8 | Facility: SURGERY | Age: 43
Setting detail: OPHTHALMOLOGY
End: 2023-03-28
Payer: MEDICAID

## 2023-03-28 DIAGNOSIS — H40.1123: ICD-10-CM

## 2023-03-28 PROCEDURE — 66710 CILIARY TRANSSLERAL THERAPY: CPT | Performed by: OPHTHALMOLOGY

## 2023-04-10 NOTE — ED PROVIDER NOTE - SKIN, MLM
[de-identified] : Impression [employer] recall, left total hip replacement, rule out tendinitis/greater trochanteric bursitis/polyethylene wear with secondary osteolysis\par Plan MRI left hip Skin normal color for race, warm, dry and intact. No evidence of rash.

## 2023-08-18 NOTE — ED ADULT NURSE NOTE - OBJECTIVE STATEMENT
Patient was seen by PCP on 8/17. Concerns were addressed.   
Pt is a AAOx3, 40y male c/o 10/10 generalized back pain. Pt stated he no longer has his pain medication and was instructed by his doctor to come to the ED due to his pain. Pt stated he has a hx of sciatica, slipped disc and dehydrated discs and he usually takes 60mg of oxycodone 4x a day. Per pt he is scheduled to go to a pain management center on 7/31. Denies cp, sob, n/v, urinary s/s or numbness/tingling.

## 2023-10-05 NOTE — ED ADULT NURSE NOTE - CAS DISCH TRANSFER METHOD
Script resent-I added comment to pharmacist regarding stolen medication/police report on record. Please verify with pharmacy if anything else is needed, and let patient know if ready to  please. Thank you! Private car

## 2023-12-28 ENCOUNTER — EMERGENCY (EMERGENCY)
Facility: HOSPITAL | Age: 43
LOS: 1 days | Discharge: HOME | End: 2023-12-28
Attending: EMERGENCY MEDICINE | Admitting: EMERGENCY MEDICINE
Payer: MEDICAID

## 2023-12-28 VITALS
OXYGEN SATURATION: 100 % | TEMPERATURE: 98 F | RESPIRATION RATE: 18 BRPM | SYSTOLIC BLOOD PRESSURE: 155 MMHG | DIASTOLIC BLOOD PRESSURE: 87 MMHG | HEART RATE: 99 BPM

## 2023-12-28 VITALS
SYSTOLIC BLOOD PRESSURE: 126 MMHG | HEART RATE: 93 BPM | TEMPERATURE: 98 F | RESPIRATION RATE: 19 BRPM | OXYGEN SATURATION: 96 % | DIASTOLIC BLOOD PRESSURE: 87 MMHG

## 2023-12-28 LAB
ALBUMIN SERPL ELPH-MCNC: 3.6 G/DL — SIGNIFICANT CHANGE UP (ref 3.3–5)
ALBUMIN SERPL ELPH-MCNC: 3.6 G/DL — SIGNIFICANT CHANGE UP (ref 3.3–5)
ALP SERPL-CCNC: 73 U/L — SIGNIFICANT CHANGE UP (ref 40–120)
ALP SERPL-CCNC: 73 U/L — SIGNIFICANT CHANGE UP (ref 40–120)
ALT FLD-CCNC: 27 U/L — SIGNIFICANT CHANGE UP (ref 4–41)
ALT FLD-CCNC: 27 U/L — SIGNIFICANT CHANGE UP (ref 4–41)
ANION GAP SERPL CALC-SCNC: 14 MMOL/L — SIGNIFICANT CHANGE UP (ref 7–14)
ANION GAP SERPL CALC-SCNC: 14 MMOL/L — SIGNIFICANT CHANGE UP (ref 7–14)
AST SERPL-CCNC: 46 U/L — HIGH (ref 4–40)
AST SERPL-CCNC: 46 U/L — HIGH (ref 4–40)
BASE EXCESS BLDV CALC-SCNC: 4.8 MMOL/L — HIGH (ref -2–3)
BASE EXCESS BLDV CALC-SCNC: 4.8 MMOL/L — HIGH (ref -2–3)
BASOPHILS # BLD AUTO: 0.13 K/UL — SIGNIFICANT CHANGE UP (ref 0–0.2)
BASOPHILS # BLD AUTO: 0.13 K/UL — SIGNIFICANT CHANGE UP (ref 0–0.2)
BASOPHILS NFR BLD AUTO: 0.9 % — SIGNIFICANT CHANGE UP (ref 0–2)
BASOPHILS NFR BLD AUTO: 0.9 % — SIGNIFICANT CHANGE UP (ref 0–2)
BILIRUB SERPL-MCNC: 0.4 MG/DL — SIGNIFICANT CHANGE UP (ref 0.2–1.2)
BILIRUB SERPL-MCNC: 0.4 MG/DL — SIGNIFICANT CHANGE UP (ref 0.2–1.2)
BLOOD GAS VENOUS COMPREHENSIVE RESULT: SIGNIFICANT CHANGE UP
BLOOD GAS VENOUS COMPREHENSIVE RESULT: SIGNIFICANT CHANGE UP
BUN SERPL-MCNC: 8 MG/DL — SIGNIFICANT CHANGE UP (ref 7–23)
BUN SERPL-MCNC: 8 MG/DL — SIGNIFICANT CHANGE UP (ref 7–23)
CALCIUM SERPL-MCNC: 9.1 MG/DL — SIGNIFICANT CHANGE UP (ref 8.4–10.5)
CALCIUM SERPL-MCNC: 9.1 MG/DL — SIGNIFICANT CHANGE UP (ref 8.4–10.5)
CHLORIDE BLDV-SCNC: 101 MMOL/L — SIGNIFICANT CHANGE UP (ref 96–108)
CHLORIDE BLDV-SCNC: 101 MMOL/L — SIGNIFICANT CHANGE UP (ref 96–108)
CHLORIDE SERPL-SCNC: 97 MMOL/L — LOW (ref 98–107)
CHLORIDE SERPL-SCNC: 97 MMOL/L — LOW (ref 98–107)
CO2 BLDV-SCNC: 32.5 MMOL/L — HIGH (ref 22–26)
CO2 BLDV-SCNC: 32.5 MMOL/L — HIGH (ref 22–26)
CO2 SERPL-SCNC: 24 MMOL/L — SIGNIFICANT CHANGE UP (ref 22–31)
CO2 SERPL-SCNC: 24 MMOL/L — SIGNIFICANT CHANGE UP (ref 22–31)
CREAT SERPL-MCNC: 0.5 MG/DL — SIGNIFICANT CHANGE UP (ref 0.5–1.3)
CREAT SERPL-MCNC: 0.5 MG/DL — SIGNIFICANT CHANGE UP (ref 0.5–1.3)
CRP SERPL-MCNC: 17.9 MG/L — HIGH
CRP SERPL-MCNC: 17.9 MG/L — HIGH
EGFR: 130 ML/MIN/1.73M2 — SIGNIFICANT CHANGE UP
EGFR: 130 ML/MIN/1.73M2 — SIGNIFICANT CHANGE UP
EOSINOPHIL # BLD AUTO: 0.52 K/UL — HIGH (ref 0–0.5)
EOSINOPHIL # BLD AUTO: 0.52 K/UL — HIGH (ref 0–0.5)
EOSINOPHIL NFR BLD AUTO: 3.5 % — SIGNIFICANT CHANGE UP (ref 0–6)
EOSINOPHIL NFR BLD AUTO: 3.5 % — SIGNIFICANT CHANGE UP (ref 0–6)
ERYTHROCYTE [SEDIMENTATION RATE] IN BLOOD: 23 MM/HR — HIGH (ref 1–15)
ERYTHROCYTE [SEDIMENTATION RATE] IN BLOOD: 23 MM/HR — HIGH (ref 1–15)
GAS PNL BLDV: 132 MMOL/L — LOW (ref 136–145)
GAS PNL BLDV: 132 MMOL/L — LOW (ref 136–145)
GLUCOSE BLDV-MCNC: 197 MG/DL — HIGH (ref 70–99)
GLUCOSE BLDV-MCNC: 197 MG/DL — HIGH (ref 70–99)
GLUCOSE SERPL-MCNC: 178 MG/DL — HIGH (ref 70–99)
GLUCOSE SERPL-MCNC: 178 MG/DL — HIGH (ref 70–99)
HCO3 BLDV-SCNC: 31 MMOL/L — HIGH (ref 22–29)
HCO3 BLDV-SCNC: 31 MMOL/L — HIGH (ref 22–29)
HCT VFR BLD CALC: 45.9 % — SIGNIFICANT CHANGE UP (ref 39–50)
HCT VFR BLD CALC: 45.9 % — SIGNIFICANT CHANGE UP (ref 39–50)
HCT VFR BLDA CALC: 48 % — SIGNIFICANT CHANGE UP (ref 39–51)
HCT VFR BLDA CALC: 48 % — SIGNIFICANT CHANGE UP (ref 39–51)
HGB BLD CALC-MCNC: 16 G/DL — SIGNIFICANT CHANGE UP (ref 12.6–17.4)
HGB BLD CALC-MCNC: 16 G/DL — SIGNIFICANT CHANGE UP (ref 12.6–17.4)
HGB BLD-MCNC: 15.5 G/DL — SIGNIFICANT CHANGE UP (ref 13–17)
HGB BLD-MCNC: 15.5 G/DL — SIGNIFICANT CHANGE UP (ref 13–17)
IANC: 9.4 K/UL — HIGH (ref 1.8–7.4)
IANC: 9.4 K/UL — HIGH (ref 1.8–7.4)
IMM GRANULOCYTES NFR BLD AUTO: 0.7 % — SIGNIFICANT CHANGE UP (ref 0–0.9)
IMM GRANULOCYTES NFR BLD AUTO: 0.7 % — SIGNIFICANT CHANGE UP (ref 0–0.9)
LACTATE BLDV-MCNC: 1.9 MMOL/L — SIGNIFICANT CHANGE UP (ref 0.5–2)
LACTATE BLDV-MCNC: 1.9 MMOL/L — SIGNIFICANT CHANGE UP (ref 0.5–2)
LYMPHOCYTES # BLD AUTO: 25.5 % — SIGNIFICANT CHANGE UP (ref 13–44)
LYMPHOCYTES # BLD AUTO: 25.5 % — SIGNIFICANT CHANGE UP (ref 13–44)
LYMPHOCYTES # BLD AUTO: 3.81 K/UL — HIGH (ref 1–3.3)
LYMPHOCYTES # BLD AUTO: 3.81 K/UL — HIGH (ref 1–3.3)
MCHC RBC-ENTMCNC: 28.8 PG — SIGNIFICANT CHANGE UP (ref 27–34)
MCHC RBC-ENTMCNC: 28.8 PG — SIGNIFICANT CHANGE UP (ref 27–34)
MCHC RBC-ENTMCNC: 33.8 GM/DL — SIGNIFICANT CHANGE UP (ref 32–36)
MCHC RBC-ENTMCNC: 33.8 GM/DL — SIGNIFICANT CHANGE UP (ref 32–36)
MCV RBC AUTO: 85.3 FL — SIGNIFICANT CHANGE UP (ref 80–100)
MCV RBC AUTO: 85.3 FL — SIGNIFICANT CHANGE UP (ref 80–100)
MONOCYTES # BLD AUTO: 0.99 K/UL — HIGH (ref 0–0.9)
MONOCYTES # BLD AUTO: 0.99 K/UL — HIGH (ref 0–0.9)
MONOCYTES NFR BLD AUTO: 6.6 % — SIGNIFICANT CHANGE UP (ref 2–14)
MONOCYTES NFR BLD AUTO: 6.6 % — SIGNIFICANT CHANGE UP (ref 2–14)
NEUTROPHILS # BLD AUTO: 9.4 K/UL — HIGH (ref 1.8–7.4)
NEUTROPHILS # BLD AUTO: 9.4 K/UL — HIGH (ref 1.8–7.4)
NEUTROPHILS NFR BLD AUTO: 62.8 % — SIGNIFICANT CHANGE UP (ref 43–77)
NEUTROPHILS NFR BLD AUTO: 62.8 % — SIGNIFICANT CHANGE UP (ref 43–77)
NRBC # BLD: 0 /100 WBCS — SIGNIFICANT CHANGE UP (ref 0–0)
NRBC # BLD: 0 /100 WBCS — SIGNIFICANT CHANGE UP (ref 0–0)
NRBC # FLD: 0 K/UL — SIGNIFICANT CHANGE UP (ref 0–0)
NRBC # FLD: 0 K/UL — SIGNIFICANT CHANGE UP (ref 0–0)
PCO2 BLDV: 50 MMHG — SIGNIFICANT CHANGE UP (ref 42–55)
PCO2 BLDV: 50 MMHG — SIGNIFICANT CHANGE UP (ref 42–55)
PH BLDV: 7.4 — SIGNIFICANT CHANGE UP (ref 7.32–7.43)
PH BLDV: 7.4 — SIGNIFICANT CHANGE UP (ref 7.32–7.43)
PLATELET # BLD AUTO: 376 K/UL — SIGNIFICANT CHANGE UP (ref 150–400)
PLATELET # BLD AUTO: 376 K/UL — SIGNIFICANT CHANGE UP (ref 150–400)
PO2 BLDV: 52 MMHG — HIGH (ref 25–45)
PO2 BLDV: 52 MMHG — HIGH (ref 25–45)
POTASSIUM BLDV-SCNC: 6.6 MMOL/L — CRITICAL HIGH (ref 3.5–5.1)
POTASSIUM BLDV-SCNC: 6.6 MMOL/L — CRITICAL HIGH (ref 3.5–5.1)
POTASSIUM SERPL-MCNC: 5.8 MMOL/L — HIGH (ref 3.5–5.3)
POTASSIUM SERPL-MCNC: 5.8 MMOL/L — HIGH (ref 3.5–5.3)
POTASSIUM SERPL-SCNC: 5.8 MMOL/L — HIGH (ref 3.5–5.3)
POTASSIUM SERPL-SCNC: 5.8 MMOL/L — HIGH (ref 3.5–5.3)
PROT SERPL-MCNC: 7.4 G/DL — SIGNIFICANT CHANGE UP (ref 6–8.3)
PROT SERPL-MCNC: 7.4 G/DL — SIGNIFICANT CHANGE UP (ref 6–8.3)
RBC # BLD: 5.38 M/UL — SIGNIFICANT CHANGE UP (ref 4.2–5.8)
RBC # BLD: 5.38 M/UL — SIGNIFICANT CHANGE UP (ref 4.2–5.8)
RBC # FLD: 12.9 % — SIGNIFICANT CHANGE UP (ref 10.3–14.5)
RBC # FLD: 12.9 % — SIGNIFICANT CHANGE UP (ref 10.3–14.5)
SAO2 % BLDV: 81.4 % — SIGNIFICANT CHANGE UP (ref 67–88)
SAO2 % BLDV: 81.4 % — SIGNIFICANT CHANGE UP (ref 67–88)
SODIUM SERPL-SCNC: 135 MMOL/L — SIGNIFICANT CHANGE UP (ref 135–145)
SODIUM SERPL-SCNC: 135 MMOL/L — SIGNIFICANT CHANGE UP (ref 135–145)
WBC # BLD: 14.96 K/UL — HIGH (ref 3.8–10.5)
WBC # BLD: 14.96 K/UL — HIGH (ref 3.8–10.5)
WBC # FLD AUTO: 14.96 K/UL — HIGH (ref 3.8–10.5)
WBC # FLD AUTO: 14.96 K/UL — HIGH (ref 3.8–10.5)

## 2023-12-28 PROCEDURE — 99285 EMERGENCY DEPT VISIT HI MDM: CPT

## 2023-12-28 PROCEDURE — 93010 ELECTROCARDIOGRAM REPORT: CPT

## 2023-12-28 PROCEDURE — 73070 X-RAY EXAM OF ELBOW: CPT | Mod: 26,50

## 2023-12-28 PROCEDURE — 73090 X-RAY EXAM OF FOREARM: CPT | Mod: 26,50

## 2023-12-28 RX ORDER — HYDROMORPHONE HYDROCHLORIDE 2 MG/ML
1 INJECTION INTRAMUSCULAR; INTRAVENOUS; SUBCUTANEOUS ONCE
Refills: 0 | Status: DISCONTINUED | OUTPATIENT
Start: 2023-12-28 | End: 2023-12-28

## 2023-12-28 RX ORDER — ALPRAZOLAM 0.25 MG
1 TABLET ORAL ONCE
Refills: 0 | Status: DISCONTINUED | OUTPATIENT
Start: 2023-12-28 | End: 2023-12-28

## 2023-12-28 RX ORDER — MORPHINE SULFATE 50 MG/1
4 CAPSULE, EXTENDED RELEASE ORAL ONCE
Refills: 0 | Status: DISCONTINUED | OUTPATIENT
Start: 2023-12-28 | End: 2023-12-28

## 2023-12-28 RX ADMIN — Medication 100 MILLIGRAM(S): at 07:51

## 2023-12-28 RX ADMIN — MORPHINE SULFATE 4 MILLIGRAM(S): 50 CAPSULE, EXTENDED RELEASE ORAL at 07:49

## 2023-12-28 RX ADMIN — HYDROMORPHONE HYDROCHLORIDE 1 MILLIGRAM(S): 2 INJECTION INTRAMUSCULAR; INTRAVENOUS; SUBCUTANEOUS at 10:17

## 2023-12-28 RX ADMIN — Medication 1 MILLIGRAM(S): at 08:32

## 2023-12-28 NOTE — ED PROVIDER NOTE - NSFOLLOWUPINSTRUCTIONS_ED_ALL_ED_FT
Please take the antibiotic (Clindamycin) which we sent to your pharmacy as prescribed.     Follow up with your primary care provider within the next week.    Return to the Emergency Department with any new/worsening symptoms.      --------------  Cellulitis    Cellulitis is a skin infection caused by bacteria. This condition occurs most often in the arms and lower legs but can occur anywhere over the body. Symptoms include redness, swelling, warm skin, tenderness, and chills/fever. If you were prescribed an antibiotic medicine, take it as told by your health care provider. Do not stop taking the antibiotic even if you start to feel better.    SEEK IMMEDIATE MEDICAL CARE IF YOU HAVE ANY OF THE FOLLOWING SYMPTOMS: worsening fever, red streaks coming from affected area, vomiting or diarrhea, or dizziness/lightheadedness.

## 2023-12-28 NOTE — ED ADULT NURSE NOTE - OBJECTIVE STATEMENT
Patient received in intake 10C, A&Ox4 ambulatory hx: DM2, HTN, HLD, chronic low back pain c/o bilateral elbow abscesses. Pt noticed lesions 3-4 days ago and have been worsening; pt believes he may have nicked the area while shaving. Pt c/o severe pain and endorses redness and purulent drainage from both sites. Denies CP, SOB, N/V/D, fever chills, or any other symptoms. Left 20g placed, labs drawn and sent.

## 2023-12-28 NOTE — ED PROVIDER NOTE - PATIENT PORTAL LINK FT
You can access the FollowMyHealth Patient Portal offered by [facility] by registering at the following website: [email or web address]. By joining [employer]’s FollowMyHealth portal, you will also be able to view your health information using other applications (apps) compatible with our system. You can access the FollowMyHealth Patient Portal offered by [facility] by registering at the following website: [email or web address]. By joining [employer]’s FollowMyHealth portal, you will also be able to view your health information using other applications (apps) compatible with our system.

## 2023-12-28 NOTE — ED ADULT NURSE NOTE - NSFALLUNIVINTERV_ED_ALL_ED
Bed/Stretcher in lowest position, wheels locked, appropriate side rails in place/Call bell, personal items and telephone in reach/Instruct patient to call for assistance before getting out of bed/chair/stretcher/Non-slip footwear applied when patient is off stretcher/[place] to call system/Physically safe environment - no spills, clutter or unnecessary equipment/Purposeful proactive rounding/Room/bathroom lighting operational, light cord in reach Bed/Stretcher in lowest position, wheels locked, appropriate side rails in place/Call bell, personal items and telephone in reach/Instruct patient to call for assistance before getting out of bed/chair/stretcher/Non-slip footwear applied when patient is off stretcher/[place] to call system/Physically safe environment - no spills, clutter or unnecessary equipment/Purposeful proactive rounding/Room/bathroom lighting operational, light cord in reach

## 2023-12-28 NOTE — ED ADULT TRIAGE NOTE - CHIEF COMPLAINT QUOTE
abscess b/l elbows    pt states shaved his arms with a razor.. has abscess to both elbows.  past medical history- diabetes type 2, high cholesterol, anxiety, lower back pain

## 2023-12-28 NOTE — ED PROVIDER NOTE - ATTENDING CONTRIBUTION TO CARE
Agree with resident note  43-year-old male with past medical history of diabetes, hyperlipidemia, hypertension, low back pain presents with bilateral redness, swelling, induration of both elbows.  Patient recently used a family members razor to shave his arms.  Then developed cellulitic areas to both elbows with drainage.  Denies any fevers or systemic symptoms.  Physical exam  Well-appearing male nontoxic  Afebrile  Vital signs stable  Clear to auscultation bilaterally  S1-S2 no murmurs rubs or gallops  Extremities bilateral indurated areas proximal to both elbows with scant amount of discharge, no fluctuance  Impression  Bilateral cellulitis with possibly small abscesses that are now actively draining, no actual fluctuance felt do not feel need for incision and drainage will get basic labs including ESR CRP, x-ray to rule out deep space infections and reassess  X-ray shows no signs of deep space infection, ESR slightly elevated as well as CRP  Will send patient home with antibiotics, elbow has full range of motion no signs or symptoms of septic arthritis

## 2023-12-28 NOTE — ED PROVIDER NOTE - PHYSICAL EXAMINATION
General: well appearing, alert, oriented to person, time, place  Psych: mood appropriate  Head: normocephalic; atraumatic  Eyes: conjunctivae clear bilaterally, sclerae anicteric  ENT: no nasal flaring, patent nares  Cardio: [regular rate and rhythm; normal heart sounds  Resp: [clear to auscultation bilaterally  GI: abdomen soft, nontender, nondistended  Neuro: strength 5/5 in upper and lower extremities; normal sensation  Skin: lesions noted to posterior aspects of proximal forearms bilaterally- circular, approx. 4 cm in diameter, erythematous, warm to touch, tender, with scant amount of purulent drainage from centers of both lesions  MSK: normal movement of extremities  Lymph/Vasc: radial pulses equal bilaterally

## 2023-12-28 NOTE — ED PROVIDER NOTE - OBJECTIVE STATEMENT
The patient is a 43-year-old male with past medical history of DM2, hyperlipidemia, hypertension, chronic low back pain presenting with concern for abscesses to bilateral elbows.  States first noticed lesions 3 to 4 days ago and they have been getting worse since that time.  Thinks possibly nicked the areas while shaving. Areas are painful and patient endorses small amount of purulent drainage from both sites. Last took any pain meds approx. 12 hours ago, took oxycodone 30mg which he takes for chronic back pain, which also helped pain he was having at the lesions. No fever, chills, nausea, vomiting, diarrhea, or any other symptoms.

## 2023-12-28 NOTE — ED PROVIDER NOTE - PROGRESS NOTE DETAILS
RAMANA Ahn PGY1- workup consistent with cellulitis, patient ok for dc home, will send clindamycin to pharmacy

## 2023-12-28 NOTE — ED PROVIDER NOTE - CLINICAL SUMMARY MEDICAL DECISION MAKING FREE TEXT BOX
RAMANA Ahn PGY1- patient here with abscesses to both elbows, worsening over past 3-4 days. Bilateral circular erythematous lesions noted to posterior forearms, both of which appear to be draining slightly. Patient afebrile but with borderline HR in 90s. Will obtain XR of bilateral elbows/forearms, cbc, cmp, esr, crp, and vbg; will give dose of clindamycin and pain control. Pending workup may dc home vs CDU for IV abx.

## 2024-05-06 NOTE — ED PROCEDURE NOTE - CPROC ED TIME OUT STATEMENT1
“Patient's name, , procedure and correct site were confirmed during the [place] Timeout.” [Cooperative] : cooperative [Euthymic] : euthymic [Full] : full [Clear] : clear [Linear/Goal Directed] : linear/goal directed [Average] : average [WNL] : within normal limits [] : No [2 - 2  to 4 times a month] : 2 - 2  to 4 times a month [3 - 7 to 9] : 3 - 7 to 9 [3 - Weekly] : 3 - Weekly [0 - Never] : 0 - Never [0 - No] : 0 - No [2 - Yes, but not in the last year] : 2 - Yes, but not in the last year [3] : 3 [4] : 4 [2] : 2 [FreeTextEntry1] : 10 [SchwartzScore] : 28
